# Patient Record
Sex: MALE | Race: BLACK OR AFRICAN AMERICAN | Employment: FULL TIME | URBAN - METROPOLITAN AREA
[De-identification: names, ages, dates, MRNs, and addresses within clinical notes are randomized per-mention and may not be internally consistent; named-entity substitution may affect disease eponyms.]

---

## 2018-08-21 ENCOUNTER — TRANSFERRED RECORDS (OUTPATIENT)
Dept: HEALTH INFORMATION MANAGEMENT | Facility: CLINIC | Age: 28
End: 2018-08-21

## 2018-08-22 ENCOUNTER — HOSPITAL ENCOUNTER (INPATIENT)
Facility: CLINIC | Age: 28
LOS: 1 days | Discharge: HOME | DRG: 115 | End: 2018-08-23
Attending: OPHTHALMOLOGY | Admitting: INTERNAL MEDICINE
Payer: OTHER MISCELLANEOUS

## 2018-08-22 ENCOUNTER — APPOINTMENT (OUTPATIENT)
Dept: GENERAL RADIOLOGY | Facility: CLINIC | Age: 28
End: 2018-08-22
Attending: EMERGENCY MEDICINE
Payer: OTHER MISCELLANEOUS

## 2018-08-22 ENCOUNTER — ANESTHESIA EVENT (OUTPATIENT)
Dept: SURGERY | Facility: CLINIC | Age: 28
DRG: 115 | End: 2018-08-22
Payer: OTHER MISCELLANEOUS

## 2018-08-22 ENCOUNTER — OFFICE VISIT (OUTPATIENT)
Dept: OPHTHALMOLOGY | Facility: CLINIC | Age: 28
End: 2018-08-22
Attending: OPHTHALMOLOGY
Payer: OTHER MISCELLANEOUS

## 2018-08-22 ENCOUNTER — ANESTHESIA (OUTPATIENT)
Dept: SURGERY | Facility: AMBULATORY SURGERY CENTER | Age: 28
End: 2018-08-22

## 2018-08-22 ENCOUNTER — HOSPITAL ENCOUNTER (EMERGENCY)
Facility: CLINIC | Age: 28
Discharge: HOME | End: 2018-08-22
Attending: EMERGENCY MEDICINE | Admitting: EMERGENCY MEDICINE
Payer: OTHER MISCELLANEOUS

## 2018-08-22 ENCOUNTER — ANESTHESIA (OUTPATIENT)
Dept: SURGERY | Facility: CLINIC | Age: 28
DRG: 115 | End: 2018-08-22
Payer: OTHER MISCELLANEOUS

## 2018-08-22 ENCOUNTER — ANESTHESIA EVENT (OUTPATIENT)
Dept: SURGERY | Facility: AMBULATORY SURGERY CENTER | Age: 28
End: 2018-08-22

## 2018-08-22 ENCOUNTER — OFFICE VISIT (OUTPATIENT)
Dept: OPHTHALMOLOGY | Facility: CLINIC | Age: 28
End: 2018-08-22
Attending: OPHTHALMOLOGY

## 2018-08-22 VITALS
BODY MASS INDEX: 26.31 KG/M2 | HEIGHT: 74 IN | OXYGEN SATURATION: 98 % | RESPIRATION RATE: 16 BRPM | SYSTOLIC BLOOD PRESSURE: 114 MMHG | WEIGHT: 205 LBS | TEMPERATURE: 98.5 F | DIASTOLIC BLOOD PRESSURE: 63 MMHG | HEART RATE: 54 BPM

## 2018-08-22 DIAGNOSIS — S05.91XA RIGHT EYE INJURY, INITIAL ENCOUNTER: ICD-10-CM

## 2018-08-22 DIAGNOSIS — S05.91XS RIGHT EYE INJURY, SEQUELA: Primary | ICD-10-CM

## 2018-08-22 DIAGNOSIS — S05.11XA CONTUSION OF GLOBE OF RIGHT EYE, INITIAL ENCOUNTER: Primary | ICD-10-CM

## 2018-08-22 DIAGNOSIS — S05.31XA RUPTURED GLOBE OF RIGHT EYE, INITIAL ENCOUNTER: Primary | ICD-10-CM

## 2018-08-22 PROBLEM — S05.90XA EYE INJURY: Status: ACTIVE | Noted: 2018-08-22

## 2018-08-22 LAB — RADIOLOGIST FLAGS: ABNORMAL

## 2018-08-22 PROCEDURE — 25000128 H RX IP 250 OP 636: Performed by: NURSE ANESTHETIST, CERTIFIED REGISTERED

## 2018-08-22 PROCEDURE — 25000128 H RX IP 250 OP 636: Performed by: ANESTHESIOLOGY

## 2018-08-22 PROCEDURE — 25000566 ZZH SEVOFLURANE, EA 15 MIN: Performed by: OPHTHALMOLOGY

## 2018-08-22 PROCEDURE — 40000135 CT MHEALTH OVERREAD

## 2018-08-22 PROCEDURE — 99285 EMERGENCY DEPT VISIT HI MDM: CPT | Mod: 25 | Performed by: EMERGENCY MEDICINE

## 2018-08-22 PROCEDURE — 25000128 H RX IP 250 OP 636: Performed by: OPHTHALMOLOGY

## 2018-08-22 PROCEDURE — 25000125 ZZHC RX 250: Performed by: NURSE ANESTHETIST, CERTIFIED REGISTERED

## 2018-08-22 PROCEDURE — 25000125 ZZHC RX 250

## 2018-08-22 PROCEDURE — 99284 EMERGENCY DEPT VISIT MOD MDM: CPT | Mod: Z6 | Performed by: EMERGENCY MEDICINE

## 2018-08-22 PROCEDURE — 25000128 H RX IP 250 OP 636: Performed by: EMERGENCY MEDICINE

## 2018-08-22 PROCEDURE — 36000062 ZZH SURGERY LEVEL 4 1ST 30 MIN - UMMC: Performed by: OPHTHALMOLOGY

## 2018-08-22 PROCEDURE — C9399 UNCLASSIFIED DRUGS OR BIOLOG: HCPCS | Performed by: NURSE ANESTHETIST, CERTIFIED REGISTERED

## 2018-08-22 PROCEDURE — G0463 HOSPITAL OUTPT CLINIC VISIT: HCPCS | Mod: ZF

## 2018-08-22 PROCEDURE — 76512 OPH US DX B-SCAN: CPT | Mod: ZF | Performed by: STUDENT IN AN ORGANIZED HEALTH CARE EDUCATION/TRAINING PROGRAM

## 2018-08-22 PROCEDURE — 27210794 ZZH OR GENERAL SUPPLY STERILE: Performed by: OPHTHALMOLOGY

## 2018-08-22 PROCEDURE — 37000009 ZZH ANESTHESIA TECHNICAL FEE, EACH ADDTL 15 MIN: Performed by: OPHTHALMOLOGY

## 2018-08-22 PROCEDURE — 96374 THER/PROPH/DIAG INJ IV PUSH: CPT | Performed by: EMERGENCY MEDICINE

## 2018-08-22 PROCEDURE — 37000008 ZZH ANESTHESIA TECHNICAL FEE, 1ST 30 MIN: Performed by: OPHTHALMOLOGY

## 2018-08-22 PROCEDURE — 40000170 ZZH STATISTIC PRE-PROCEDURE ASSESSMENT II: Performed by: OPHTHALMOLOGY

## 2018-08-22 PROCEDURE — 25000125 ZZHC RX 250: Performed by: OPHTHALMOLOGY

## 2018-08-22 PROCEDURE — 12000001 ZZH R&B MED SURG/OB UMMC

## 2018-08-22 PROCEDURE — 96376 TX/PRO/DX INJ SAME DRUG ADON: CPT | Performed by: EMERGENCY MEDICINE

## 2018-08-22 PROCEDURE — 36000064 ZZH SURGERY LEVEL 4 EA 15 ADDTL MIN - UMMC: Performed by: OPHTHALMOLOGY

## 2018-08-22 PROCEDURE — 25000132 ZZH RX MED GY IP 250 OP 250 PS 637: Performed by: ANESTHESIOLOGY

## 2018-08-22 PROCEDURE — 71000014 ZZH RECOVERY PHASE 1 LEVEL 2 FIRST HR: Performed by: OPHTHALMOLOGY

## 2018-08-22 PROCEDURE — 40000269 ZZH STATISTIC NO CHARGE FACILITY FEE: Mod: ZF

## 2018-08-22 RX ORDER — ATROPINE SULFATE 10 MG/ML
SOLUTION/ DROPS OPHTHALMIC PRN
Status: DISCONTINUED | OUTPATIENT
Start: 2018-08-22 | End: 2018-08-22 | Stop reason: HOSPADM

## 2018-08-22 RX ORDER — PREDNISOLONE ACETATE 10 MG/ML
1-2 SUSPENSION/ DROPS OPHTHALMIC
Qty: 1 BOTTLE | Refills: 0 | Status: ON HOLD | OUTPATIENT
Start: 2018-08-22 | End: 2018-08-23

## 2018-08-22 RX ORDER — ATROPINE SULFATE 10 MG/ML
1 SOLUTION/ DROPS OPHTHALMIC 2 TIMES DAILY
Status: DISCONTINUED | OUTPATIENT
Start: 2018-08-22 | End: 2018-08-22

## 2018-08-22 RX ORDER — LIDOCAINE HYDROCHLORIDE 20 MG/ML
INJECTION, SOLUTION INFILTRATION; PERINEURAL PRN
Status: DISCONTINUED | OUTPATIENT
Start: 2018-08-22 | End: 2018-08-22

## 2018-08-22 RX ORDER — PREDNISOLONE ACETATE 10 MG/ML
1 SUSPENSION/ DROPS OPHTHALMIC 4 TIMES DAILY
Status: DISCONTINUED | OUTPATIENT
Start: 2018-08-23 | End: 2018-08-23 | Stop reason: HOSPADM

## 2018-08-22 RX ORDER — PROPOFOL 10 MG/ML
INJECTION, EMULSION INTRAVENOUS PRN
Status: DISCONTINUED | OUTPATIENT
Start: 2018-08-22 | End: 2018-08-22

## 2018-08-22 RX ORDER — MEPERIDINE HYDROCHLORIDE 25 MG/ML
12.5 INJECTION INTRAMUSCULAR; INTRAVENOUS; SUBCUTANEOUS
Status: DISCONTINUED | OUTPATIENT
Start: 2018-08-22 | End: 2018-08-22 | Stop reason: HOSPADM

## 2018-08-22 RX ORDER — FENTANYL CITRATE 50 UG/ML
INJECTION, SOLUTION INTRAMUSCULAR; INTRAVENOUS PRN
Status: DISCONTINUED | OUTPATIENT
Start: 2018-08-22 | End: 2018-08-22

## 2018-08-22 RX ORDER — SODIUM CHLORIDE, SODIUM LACTATE, POTASSIUM CHLORIDE, CALCIUM CHLORIDE 600; 310; 30; 20 MG/100ML; MG/100ML; MG/100ML; MG/100ML
INJECTION, SOLUTION INTRAVENOUS CONTINUOUS
Status: DISCONTINUED | OUTPATIENT
Start: 2018-08-22 | End: 2018-08-22 | Stop reason: HOSPADM

## 2018-08-22 RX ORDER — ONDANSETRON 4 MG/1
4 TABLET, ORALLY DISINTEGRATING ORAL EVERY 30 MIN PRN
Status: DISCONTINUED | OUTPATIENT
Start: 2018-08-22 | End: 2018-08-22 | Stop reason: HOSPADM

## 2018-08-22 RX ORDER — NALOXONE HYDROCHLORIDE 0.4 MG/ML
.1-.4 INJECTION, SOLUTION INTRAMUSCULAR; INTRAVENOUS; SUBCUTANEOUS
Status: DISCONTINUED | OUTPATIENT
Start: 2018-08-22 | End: 2018-08-22 | Stop reason: HOSPADM

## 2018-08-22 RX ORDER — ATROPINE SULFATE 10 MG/ML
1-2 SOLUTION/ DROPS OPHTHALMIC 2 TIMES DAILY
Qty: 1 BOTTLE | Refills: 11 | Status: ON HOLD | OUTPATIENT
Start: 2018-08-22 | End: 2018-08-23

## 2018-08-22 RX ORDER — ONDANSETRON 2 MG/ML
4 INJECTION INTRAMUSCULAR; INTRAVENOUS EVERY 6 HOURS PRN
Status: DISCONTINUED | OUTPATIENT
Start: 2018-08-22 | End: 2018-08-23 | Stop reason: HOSPADM

## 2018-08-22 RX ORDER — OXYCODONE AND ACETAMINOPHEN 5; 325 MG/1; MG/1
1 TABLET ORAL EVERY 6 HOURS PRN
Status: DISCONTINUED | OUTPATIENT
Start: 2018-08-22 | End: 2018-08-23 | Stop reason: HOSPADM

## 2018-08-22 RX ORDER — DEXAMETHASONE SODIUM PHOSPHATE 4 MG/ML
INJECTION, SOLUTION INTRA-ARTICULAR; INTRALESIONAL; INTRAMUSCULAR; INTRAVENOUS; SOFT TISSUE PRN
Status: DISCONTINUED | OUTPATIENT
Start: 2018-08-22 | End: 2018-08-22 | Stop reason: HOSPADM

## 2018-08-22 RX ORDER — BALANCED SALT SOLUTION 6.4; .75; .48; .3; 3.9; 1.7 MG/ML; MG/ML; MG/ML; MG/ML; MG/ML; MG/ML
SOLUTION OPHTHALMIC PRN
Status: DISCONTINUED | OUTPATIENT
Start: 2018-08-22 | End: 2018-08-22 | Stop reason: HOSPADM

## 2018-08-22 RX ORDER — ONDANSETRON 2 MG/ML
4 INJECTION INTRAMUSCULAR; INTRAVENOUS EVERY 30 MIN PRN
Status: DISCONTINUED | OUTPATIENT
Start: 2018-08-22 | End: 2018-08-22 | Stop reason: HOSPADM

## 2018-08-22 RX ORDER — ATROPINE SULFATE 10 MG/ML
1 SOLUTION/ DROPS OPHTHALMIC 2 TIMES DAILY
Status: DISCONTINUED | OUTPATIENT
Start: 2018-08-23 | End: 2018-08-23 | Stop reason: HOSPADM

## 2018-08-22 RX ORDER — FENTANYL CITRATE 50 UG/ML
25-50 INJECTION, SOLUTION INTRAMUSCULAR; INTRAVENOUS
Status: DISCONTINUED | OUTPATIENT
Start: 2018-08-22 | End: 2018-08-22 | Stop reason: HOSPADM

## 2018-08-22 RX ORDER — SODIUM CHLORIDE, SODIUM LACTATE, POTASSIUM CHLORIDE, CALCIUM CHLORIDE 600; 310; 30; 20 MG/100ML; MG/100ML; MG/100ML; MG/100ML
INJECTION, SOLUTION INTRAVENOUS CONTINUOUS PRN
Status: DISCONTINUED | OUTPATIENT
Start: 2018-08-22 | End: 2018-08-22

## 2018-08-22 RX ORDER — FENTANYL CITRATE 50 UG/ML
50 INJECTION, SOLUTION INTRAMUSCULAR; INTRAVENOUS
Status: ACTIVE | OUTPATIENT
Start: 2018-08-22 | End: 2018-08-22

## 2018-08-22 RX ORDER — ESMOLOL HYDROCHLORIDE 10 MG/ML
INJECTION INTRAVENOUS PRN
Status: DISCONTINUED | OUTPATIENT
Start: 2018-08-22 | End: 2018-08-22

## 2018-08-22 RX ORDER — DEXAMETHASONE SODIUM PHOSPHATE 4 MG/ML
INJECTION, SOLUTION INTRA-ARTICULAR; INTRALESIONAL; INTRAMUSCULAR; INTRAVENOUS; SOFT TISSUE PRN
Status: DISCONTINUED | OUTPATIENT
Start: 2018-08-22 | End: 2018-08-22

## 2018-08-22 RX ORDER — OXYCODONE HYDROCHLORIDE 5 MG/1
5 TABLET ORAL ONCE
Status: COMPLETED | OUTPATIENT
Start: 2018-08-22 | End: 2018-08-22

## 2018-08-22 RX ORDER — ACETAMINOPHEN 325 MG/1
650 TABLET ORAL EVERY 4 HOURS PRN
Status: DISCONTINUED | OUTPATIENT
Start: 2018-08-22 | End: 2018-08-23 | Stop reason: HOSPADM

## 2018-08-22 RX ORDER — NALOXONE HYDROCHLORIDE 0.4 MG/ML
.1-.4 INJECTION, SOLUTION INTRAMUSCULAR; INTRAVENOUS; SUBCUTANEOUS
Status: DISCONTINUED | OUTPATIENT
Start: 2018-08-22 | End: 2018-08-23 | Stop reason: HOSPADM

## 2018-08-22 RX ORDER — MOXIFLOXACIN 5 MG/ML
1 SOLUTION/ DROPS OPHTHALMIC 4 TIMES DAILY
Status: DISCONTINUED | OUTPATIENT
Start: 2018-08-23 | End: 2018-08-23 | Stop reason: HOSPADM

## 2018-08-22 RX ORDER — MULTIPLE VITAMINS W/ MINERALS TAB 9MG-400MCG
1 TAB ORAL DAILY
COMMUNITY

## 2018-08-22 RX ORDER — PROPOFOL 10 MG/ML
INJECTION, EMULSION INTRAVENOUS CONTINUOUS PRN
Status: DISCONTINUED | OUTPATIENT
Start: 2018-08-22 | End: 2018-08-22

## 2018-08-22 RX ORDER — HYDROMORPHONE HYDROCHLORIDE 1 MG/ML
1 INJECTION, SOLUTION INTRAMUSCULAR; INTRAVENOUS; SUBCUTANEOUS
Status: DISCONTINUED | OUTPATIENT
Start: 2018-08-22 | End: 2018-08-22 | Stop reason: HOSPADM

## 2018-08-22 RX ORDER — OXYCODONE AND ACETAMINOPHEN 5; 325 MG/1; MG/1
1 TABLET ORAL EVERY 6 HOURS PRN
Qty: 20 TABLET | Refills: 0 | Status: ON HOLD | OUTPATIENT
Start: 2018-08-22 | End: 2018-08-23

## 2018-08-22 RX ADMIN — FENTANYL CITRATE 100 MCG: 50 INJECTION, SOLUTION INTRAMUSCULAR; INTRAVENOUS at 20:49

## 2018-08-22 RX ADMIN — Medication 1 MG: at 07:54

## 2018-08-22 RX ADMIN — SODIUM CHLORIDE, POTASSIUM CHLORIDE, SODIUM LACTATE AND CALCIUM CHLORIDE: 600; 310; 30; 20 INJECTION, SOLUTION INTRAVENOUS at 16:56

## 2018-08-22 RX ADMIN — FENTANYL CITRATE 50 MCG: 50 INJECTION, SOLUTION INTRAMUSCULAR; INTRAVENOUS at 17:35

## 2018-08-22 RX ADMIN — FENTANYL CITRATE 25 MCG: 50 INJECTION, SOLUTION INTRAMUSCULAR; INTRAVENOUS at 17:30

## 2018-08-22 RX ADMIN — FENTANYL CITRATE 100 MCG: 50 INJECTION, SOLUTION INTRAMUSCULAR; INTRAVENOUS at 17:07

## 2018-08-22 RX ADMIN — SODIUM CHLORIDE, POTASSIUM CHLORIDE, SODIUM LACTATE AND CALCIUM CHLORIDE: 600; 310; 30; 20 INJECTION, SOLUTION INTRAVENOUS at 20:55

## 2018-08-22 RX ADMIN — HYDROMORPHONE HYDROCHLORIDE 0.25 MG: 1 INJECTION, SOLUTION INTRAMUSCULAR; INTRAVENOUS; SUBCUTANEOUS at 17:38

## 2018-08-22 RX ADMIN — PROPOFOL 200 MG: 10 INJECTION, EMULSION INTRAVENOUS at 17:03

## 2018-08-22 RX ADMIN — ROCURONIUM BROMIDE 50 MG: 10 INJECTION INTRAVENOUS at 17:05

## 2018-08-22 RX ADMIN — HYDROMORPHONE HYDROCHLORIDE 0.25 MG: 1 INJECTION, SOLUTION INTRAMUSCULAR; INTRAVENOUS; SUBCUTANEOUS at 19:30

## 2018-08-22 RX ADMIN — PROPOFOL 100 MG: 10 INJECTION, EMULSION INTRAVENOUS at 17:05

## 2018-08-22 RX ADMIN — ESMOLOL HYDROCHLORIDE 100 MG: 10 INJECTION, SOLUTION INTRAVENOUS at 17:07

## 2018-08-22 RX ADMIN — DEXAMETHASONE SODIUM PHOSPHATE 8 MG: 4 INJECTION, SOLUTION INTRAMUSCULAR; INTRAVENOUS at 17:24

## 2018-08-22 RX ADMIN — OXYCODONE HYDROCHLORIDE 5 MG: 5 TABLET ORAL at 22:08

## 2018-08-22 RX ADMIN — SUGAMMADEX 200 MG: 100 INJECTION, SOLUTION INTRAVENOUS at 20:30

## 2018-08-22 RX ADMIN — HYDROMORPHONE HYDROCHLORIDE 0.25 MG: 1 INJECTION, SOLUTION INTRAMUSCULAR; INTRAVENOUS; SUBCUTANEOUS at 19:40

## 2018-08-22 RX ADMIN — LIDOCAINE HYDROCHLORIDE 60 MG: 20 INJECTION, SOLUTION INFILTRATION; PERINEURAL at 17:05

## 2018-08-22 RX ADMIN — ONDANSETRON 4 MG: 2 INJECTION INTRAMUSCULAR; INTRAVENOUS at 20:22

## 2018-08-22 RX ADMIN — FENTANYL CITRATE 25 MCG: 50 INJECTION, SOLUTION INTRAMUSCULAR; INTRAVENOUS at 17:32

## 2018-08-22 RX ADMIN — PROPOFOL 30 MCG/KG/MIN: 10 INJECTION, EMULSION INTRAVENOUS at 17:26

## 2018-08-22 RX ADMIN — FENTANYL CITRATE 50 MCG: 50 INJECTION, SOLUTION INTRAMUSCULAR; INTRAVENOUS at 16:02

## 2018-08-22 RX ADMIN — Medication 1 MG: at 06:38

## 2018-08-22 RX ADMIN — HYDROMORPHONE HYDROCHLORIDE 0.25 MG: 1 INJECTION, SOLUTION INTRAMUSCULAR; INTRAVENOUS; SUBCUTANEOUS at 17:46

## 2018-08-22 ASSESSMENT — CONF VISUAL FIELD
OD_INFERIOR_TEMPORAL_RESTRICTION: 1
OD_INFERIOR_TEMPORAL_RESTRICTION: 1
OD_SUPERIOR_TEMPORAL_RESTRICTION: 1
OD_INFERIOR_NASAL_RESTRICTION: 1
OD_SUPERIOR_NASAL_RESTRICTION: 1
OS_NORMAL: 1
OD_SUPERIOR_TEMPORAL_RESTRICTION: 1
OD_SUPERIOR_NASAL_RESTRICTION: 1
OD_INFERIOR_NASAL_RESTRICTION: 1

## 2018-08-22 ASSESSMENT — VISUAL ACUITY
METHOD: SNELLEN - LINEAR
OD_SC: LP
OD_SC: HM
OS_SC: 20/20
OS_SC: 20/20
METHOD: SNELLEN - LINEAR

## 2018-08-22 ASSESSMENT — TONOMETRY
OD_IOP_MMHG: 17
OD_IOP_MMHG: 17
IOP_METHOD: ICARE
OS_IOP_MMHG: 17
IOP_METHOD: ICARE
OS_IOP_MMHG: 17

## 2018-08-22 ASSESSMENT — SLIT LAMP EXAM - LIDS: COMMENTS: NORMAL

## 2018-08-22 ASSESSMENT — EXTERNAL EXAM - LEFT EYE: OS_EXAM: NORMAL

## 2018-08-22 NOTE — LETTER
8/22/2018       RE: Willam Mills  [address] [facility] [address]     Dear Colleague,    Thank you for referring your patient, Willam Mills, to the EYE CLINIC at [facility]. Please see a copy of my visit note below.    CC: trauma right eye  HPI: Willam Mills is a  27 year old year-old patient with history of trauma to the right eye with a baseball yesterday in [place]. Referred here for management    Retinal Imaging:  bscan right eye   RE: vitreous hemorrahge, lens subluxation, choroidal hemorrhage, possible RD     Assessment & Plan:  1- trauma right eye   - possible Ruptured globe right eye   - possible  posterior rupture given hyperdeep chamber and US and CT   - need to have exploration and possible repair today   - if too posterior rupture globe is found, will need to await closure of posterior rupture prior to exploration and rapair by retina   - will prescribe pain meds   - follow up 1 week with retina    - Might need vitrectomy surgery in the future (PPV/SB/EL/FAX/SO/ possible lensectomy)    2- orbital floor fx   - seen on CT scan    - no obvious pneumo-orbit by palpation or on CT   - pain to palpation of the infraorbital rim   - decreased V2 sensation    - patient with pain to movement of the eye supra and infra duction    -  oculoplastics consulted    Chele Madsen MD, PhD  Vitreoretinal Surgery Fellow  ~~~~~~~~~~~~~~~~~~~~~~~~~~~~~~~~~~  Complete documentation of historical and exam elements from today's encounter can be found in the full encounter summary report (not reduplicated in this progress note).  I personally obtained the chief complaint(s) and history of present illness.  I confirmed and edited as necessary the review of systems, past medical/surgical history, family history, social history, and examination findings as documented by others; and I examined the patient myself.  I personally reviewed the relevant tests, images, and reports as  documented above.  I personally reviewed the ophthalmic test(s) associated with this encounter, agree with the interpretation(s) as documented by the resident/fellow, and have edited the corresponding report(s) as necessary.   I formulated and edited as necessary the assessment and plan and discussed the findings and management plan with the patient and family. Norma Aleman MD      Again, thank you for allowing me to participate in the care of your patient.      Sincerely,    Norma Aleman MD     Retina Service   Department of Ophthalmology and Visual Neurosciences   [facility]  Phone:  [phone]   Fax:  [phone]

## 2018-08-22 NOTE — MR AVS SNAPSHOT
After Visit Summary   2018    Willam Mills    MRN: [id number]           Patient Information     Date Of Birth          1990        Visit Information        Provider Department      2018 8:00 AM Sade Fink MD [facility]        Today's Diagnoses     Contusion of globe of right eye, initial encounter    -  1       Follow-ups after your visit        Your next 10 appointments already scheduled     Aug 30, 2018  1:00 PM CDT   RETURN GENERAL with Edu Krishnamurthy MD   Eye Clinic ([facility])    [address] [address]   [phone]            Aug 30, 2018  1:30 PM CDT   Post-Op with Norma Aleman MD   Eye Clinic ([facility])    [address] [address]   [phone]              Who to contact     Please call your clinic at [phone] to:    Ask questions about your health    Make or cancel appointments    Discuss your medicines    Learn about your test results    Speak to your doctor            Additional Information About Your Visit        [employer] Information     [employer] is an electronic gateway that provides easy, online access to your medical records. With [employer], you can request a clinic appointment, read your test results, renew a prescription or communicate with your care team.     To sign up for [employer] visit the website at [email or web address]   You will be asked to enter the access code listed below, as well as some personal information. Please follow the directions to create your username and password.     Your access code is: [email or web address]  Expires: 2018  8:19 AM     Your access code will  in 90 days. If you need help or a new code, please contact your [facility] or call [phone] for assistance.        Care EveryWhere ID     This is your Care EveryWhere ID. This could be  used by other organizations to access your [place] medical records  [id number]         Blood Pressure from Last 3 Encounters:   08/23/18 133/75   08/22/18 114/63    Weight from Last 3 Encounters:   08/22/18 92.5 kg (203 lb 14.8 oz)   08/22/18 93 kg (205 lb)              We Performed the Following     Ultrasound B-scan OD (right eye)          Today's Medication Changes          These changes are accurate as of 8/22/18  3:30 PM.  If you have any questions, ask your nurse or doctor.               Start taking these medicines.        Dose/Directions    atropine 1 % ophthalmic solution   Used for:  Ruptured globe of right eye, initial encounter   Started by:  Norma Aleman MD        Dose:  1-2 drop   Place 1-2 drops into the right eye 2 times daily   Quantity:  1 Bottle   Refills:  11       prednisoLONE acetate 1 % ophthalmic susp   Commonly known as:  PRED FORTE   Used for:  Ruptured globe of right eye, initial encounter   Started by:  Norma Aleman MD        Dose:  1-2 drop   Place 1-2 drops into the right eye every 2 hours   Quantity:  1 Bottle   Refills:  0         These medicines have changed or have updated prescriptions.        Dose/Directions    * oxyCODONE-acetaminophen 5-325 MG per tablet   Commonly known as:  PERCOCET   This may have changed:  You were already taking a medication with the same name, and this prescription was added. Make sure you understand how and when to take each.   Used for:  Ruptured globe of right eye, initial encounter   Changed by:  Norma Aleman MD        Dose:  1 tablet   Take 1 tablet by mouth every 6 hours as needed for pain   Quantity:  20 tablet   Refills:  0       * oxyCODONE-acetaminophen 5-325 MG per tablet   Commonly known as:  PERCOCET   This may have changed:  Another medication with the same name was added. Make sure you understand how and when to take each.   Used for:  Right eye injury, sequela   Changed by:  Norma Aleman MD         Dose:  1 tablet   Take 1 tablet by mouth every 6 hours as needed for pain   Quantity:  20 tablet   Refills:  0       * Notice:  This list has 2 medication(s) that are the same as other medications prescribed for you. Read the directions carefully, and ask your doctor or other care provider to review them with you.         Where to get your medicines      These medications were sent to [id number]  [place] - [address]  [address] [place] [address]     Phone:  [phone]     atropine 1 % ophthalmic solution    prednisoLONE acetate 1 % ophthalmic susp         Some of these will need a paper prescription and others can be bought over the counter.  Ask your nurse if you have questions.     Bring a paper prescription for each of these medications     oxyCODONE-acetaminophen 5-325 MG per tablet                Primary Care Provider    None Specified       No primary provider on file.        Equal Access to Services     SIVAN [facility] : Marbin Colon, sis guerin, janak christensen, duc denson. So [facility] [phone].    ATENCIÓN: Si habla español, tiene a stacy disposición servicios gratuitos de asistencia lingüística. Krishan al [phone].    We comply with applicable federal civil rights laws and [place] laws. We do not discriminate on the basis of race, color, national origin, age, disability, sex, sexual orientation, or gender identity.            Thank you!     Thank you for choosing EYE CLINIC  for your care. Our goal is always to provide you with excellent care. Hearing back from our patients is one way we can continue to improve our services. Please take a few minutes to complete the written survey that you may receive in the mail after your visit with us. Thank you!             Your Updated Medication List - Protect others around you: Learn how to safely use, store and throw away your medicines at  www.disposemymeds.org.          This list is accurate as of 8/22/18  3:30 PM.  Always use your most recent med list.                   Brand Name Dispense Instructions for use Diagnosis    atropine 1 % ophthalmic solution     1 Bottle    Place 1-2 drops into the right eye 2 times daily    Ruptured globe of right eye, initial encounter       * oxyCODONE-acetaminophen 5-325 MG per tablet    PERCOCET    20 tablet    Take 1 tablet by mouth every 6 hours as needed for pain    Ruptured globe of right eye, initial encounter       * oxyCODONE-acetaminophen 5-325 MG per tablet    PERCOCET    20 tablet    Take 1 tablet by mouth every 6 hours as needed for pain    Right eye injury, sequela       prednisoLONE acetate 1 % ophthalmic susp    PRED FORTE    1 Bottle    Place 1-2 drops into the right eye every 2 hours    Ruptured globe of right eye, initial encounter       * Notice:  This list has 2 medication(s) that are the same as other medications prescribed for you. Read the directions carefully, and ask your doctor or other care provider to review them with you.
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
## 2018-08-22 NOTE — MR AVS SNAPSHOT
After Visit Summary   2018    Willam Mills    MRN: [id number]           Patient Information     Date Of Birth          1990        Visit Information        Provider Department      2018 10:30 AM Norma Aleman MD [facility]        Today's Diagnoses     Ruptured globe of right eye, initial encounter    -  1       Follow-ups after your visit        Follow-up notes from your care team     Return in about 1 week (around 2018).      Who to contact     Please call your clinic at [phone] to:    Ask questions about your health    Make or cancel appointments    Discuss your medicines    Learn about your test results    Speak to your doctor            Additional Information About Your Visit        MyChart Information     [email or web address] is an electronic gateway that provides easy, online access to your medical records. With [employer], you can request a clinic appointment, read your test results, renew a prescription or communicate with your care team.     To sign up for [employer] visit the website at [email or web address]   You will be asked to enter the access code listed below, as well as some personal information. Please follow the directions to create your username and password.     Your access code is: [email or web address]  Expires: 2018  8:19 AM     Your access code will  in 90 days. If you need help or a new code, please contact your [facility] or call [phone] for assistance.        Care EveryWhere ID     This is your Care EveryWhere ID. This could be used by other organizations to access your [place] medical records  [id number]         Blood Pressure from Last 3 Encounters:   18 114/63    Weight from Last 3 Encounters:   18 92.5 kg (203 lb 14.8 oz)   18 93 kg (205 lb)              Today, you had the following     No orders found for display         Today's Medication Changes          These changes are accurate as  of 8/22/18  3:30 PM.  If you have any questions, ask your nurse or doctor.               Start taking these medicines.        Dose/Directions    atropine 1 % ophthalmic solution   Used for:  Ruptured globe of right eye, initial encounter   Started by:  Norma Aleman MD        Dose:  1-2 drop   Place 1-2 drops into the right eye 2 times daily   Quantity:  1 Bottle   Refills:  11       oxyCODONE-acetaminophen 5-325 MG per tablet   Commonly known as:  PERCOCET   Used for:  Ruptured globe of right eye, initial encounter   Started by:  Norma Aleman MD        Dose:  1 tablet   Take 1 tablet by mouth every 6 hours as needed for pain   Quantity:  20 tablet   Refills:  0       prednisoLONE acetate 1 % ophthalmic susp   Commonly known as:  PRED FORTE   Used for:  Ruptured globe of right eye, initial encounter   Started by:  Norma Aleman MD        Dose:  1-2 drop   Place 1-2 drops into the right eye every 2 hours   Quantity:  1 Bottle   Refills:  0            Where to get your medicines      These medications were sent to [address]  [address] [facility] [address]     Phone:  [phone]     atropine 1 % ophthalmic solution    prednisoLONE acetate 1 % ophthalmic susp         Some of these will need a paper prescription and others can be bought over the counter.  Ask your nurse if you have questions.     Bring a paper prescription for each of these medications     oxyCODONE-acetaminophen 5-325 MG per tablet               Information about OPIOIDS     PRESCRIPTION OPIOIDS: WHAT YOU NEED TO KNOW   We gave you an opioid (narcotic) pain medicine. It is important to manage your pain, but opioids are not always the best choice. You should first try all the other options your care team gave you. Take this medicine for as short a time (and as few doses) as possible.    Some activities can increase your pain, such as bandage changes or therapy  sessions. It may help to take your pain medicine 30 to 60 minutes before these activities. Reduce your stress by getting enough sleep, working on hobbies you enjoy and practicing relaxation or meditation. Talk to your care team about ways to manage your pain beyond prescription opioids.    These medicines have risks:    DO NOT drive when on new or higher doses of pain medicine. These medicines can affect your alertness and reaction times, and you could be arrested for driving under the influence (DUI). If you need to use opioids long-term, talk to your care team about driving.    DO NOT operate heavy machinery    DO NOT do any other dangerous activities while taking these medicines.    DO NOT drink any alcohol while taking these medicines.     If the opioid prescribed includes acetaminophen, DO NOT take with any other medicines that contain acetaminophen. Read all labels carefully. Look for the word  acetaminophen  or  Tylenol.  Ask your pharmacist if you have questions or are unsure.    You can get addicted to pain medicines, especially if you have a history of addiction (chemical, alcohol or substance dependence). Talk to your care team about ways to reduce this risk.    All opioids tend to cause constipation. Drink plenty of water and eat foods that have a lot of fiber, such as fruits, vegetables, prune juice, apple juice and high-fiber cereal. Take a laxative (Miralax, milk of magnesia, Colace, Senna) if you don t move your bowels at least every other day. Other side effects include upset stomach, sleepiness, dizziness, throwing up, tolerance (needing more of the medicine to have the same effect), physical dependence and slowed breathing.    Store your pills in a secure place, locked if possible. We will not replace any lost or stolen medicine. If you don t finish your medicine, please throw away (dispose) as directed by your pharmacist. The [place] Pollution Control Agency has more information about safe  disposal: [email or web address].pca.[email or web address].mn.us/living-green/managing-unwanted-medications         Primary Care Provider Fax #    Provider Not In System [phone]                Equal Access to Services     YEEPETE BRENDON : Hadii aad ku hadmichelle Colon, wabekada luqadaha, qatonta kaelier christensen, duc dickbrendan jarett. So [facility] [phone].    ATENCIÓN: Si habla español, tiene a stacy disposición servicios gratuitos de asistencia lingüística. Llame al [phone].    We comply with applicable federal civil rights laws and [place] laws. We do not discriminate on the basis of race, color, national origin, age, disability, sex, sexual orientation, or gender identity.            Thank you!     Thank you for choosing EYE CLINIC  for your care. Our goal is always to provide you with excellent care. Hearing back from our patients is one way we can continue to improve our services. Please take a few minutes to complete the written survey that you may receive in the mail after your visit with us. Thank you!             Your Updated Medication List - Protect others around you: Learn how to safely use, store and throw away your medicines at www.disposemymeds.org.          This list is accurate as of 8/22/18  3:30 PM.  Always use your most recent med list.                   Brand Name Dispense Instructions for use Diagnosis    atropine 1 % ophthalmic solution     1 Bottle    Place 1-2 drops into the right eye 2 times daily    Ruptured globe of right eye, initial encounter       oxyCODONE-acetaminophen 5-325 MG per tablet    PERCOCET    20 tablet    Take 1 tablet by mouth every 6 hours as needed for pain    Ruptured globe of right eye, initial encounter       prednisoLONE acetate 1 % ophthalmic susp    PRED FORTE    1 Bottle    Place 1-2 drops into the right eye every 2 hours    Ruptured globe of right eye, initial encounter
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
## 2018-08-22 NOTE — CONSULTS
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 He was seen at an ED in [place] by an ophthalmologist who was concerned for ruptured globe based on CT scan as well as medial and orbital wall fractures. He was transferred here for further evaluation.    He is otherwise healthy, no past ocular or medical history, no history of eye surgeries.    He received IV moxifloxacin, tetanus shot, eye shielded, IV zofran prior to transportation to this hospital. Arrived with shield in place.          Review of systems were otherwise negative except for that which has been stated above.      OCULAR/MEDICAL/SURGICAL HISTORIES:     Past Ocular History:  none    History reviewed. No pertinent past medical history.    History reviewed. No pertinent surgical history.    EXAMINATION:     Visual Acuity: Right Eye Hand motion ; Left Eye 20/20+1   Pupils: Unable to assess right eye, Right APD by reverse    Intraocular Pressure: RE  13  (gentle); LE 16  mmHg by tonopen (<5% error).   Motility: RE limited infraduction; LE Full  Confrontational Visual Field: RE unable; LE Full     External/Slit Lamp Exam   RIGHT EYE   Lids/Lashes: significant periorbital edema   Conj/Sclera: +340 degree hemorrhagic chemosis, area of inferonasal sparing off the margin of the iris   Cornea:  Clear   Ant Chamber: inferior hyphema difficult to measure, dense microhyphema, formed AC   Iris: unable to assess   Lens: unable to assess  LEFT   Lids/lashes: No Abnormality   Conj/Sclera: White and Quiet   Cornea:  Clear   Ant Chamber:  Deep and Quiet   Iris: Round and Reactive   Lens:Clear    Dilated Fundus Exam  Eyes Dilated? LEFT only   Time: 7:40AM With Phenylephrine 2.5% and Mydriacyl 1%    (Normally, dilation with the  above lasts about 4-6 hours)  See clinic note for details    Labs/Studies/Imaging Performed  CT scan: posterior hemorrhage, posterior globe deformity  CT scan reviewed with neuroradiology attending, concern for posterior rupture.Orbital floor fracture, soft tissue swelling and hemorrhage, fracture of right lamina papyracea     ASSESSMENT/PLAN:     Willam Mills is a 27 year old male who presents with right posterior ruptured globe.  - Will bring patient to clinic for B scan, see clinic note for details  - further plan as in Clinic note, will likely allow 1-2 weeks of healing, then plan for vitrectomy      It is our pleasure to participate in this patient's care and treatment. Please contact us with any further questions or concerns.    Discussed with Edu Krishnamurthy, neuro-ophthalmology fellow,    Sade Fink MD   PGY-3 Ophthalmology  [phone]

## 2018-08-22 NOTE — ED AVS SNAPSHOT
[facility], Emergency Department    [address] [facility] [address]    Phone:  [phone]                                       Willam Mills   MRN: [id number]    Department:  [facility], Emergency Department   Date of Visit:  8/22/2018           Patient Information     Date Of Birth          1990        Your diagnoses for this visit were:     Right eye injury, initial encounter        You were seen by Royce Leal MD and Shilpa Swenson MD.        Discharge Instructions       Follow-up with ophthalmology as scheduled.      If you have significant headache, persistent vomiting, dizziness, or other concerns, return to the emergency department for re-evaluation.      Your next 10 appointments already scheduled     Aug 22, 2018   Procedure with Edu Krishnamurthy MD   [facility] ([facility])    [address] [place] [address]  [address] [facility] [address] [phone]           Located in the Clinics and Surgery Center at [address] [place] [address].   parking is very convenient and highly recommended.  is a $6 flat rate fee.  Both  and self parkers should enter the main arrival plaza from [facility]; parking attendants will direct you based on your parking preference.              24 Hour Appointment Hotline       To make an appointment at any [facility], call [phone] ([phone]). If you don't have a family doctor or clinic, we will help you find one. [facility] are conveniently located to serve the needs of you and your family.             Review of your medicines      Notice     You have not been prescribed any medications.            Procedures and tests performed during your visit     CT [facility] Overread      Orders Needing Specimen Collection     None      Pending Results     Date and Time Order Name Status Description    8/22/2018 0715 CT [facility] Overread In process            "  Pending Culture Results     No orders found from 2018 to 2018.            Pending Results Instructions     If you had any lab results that were not finalized at the time of your Discharge, you can call the ED Lab Result RN at [phone]. You will be contacted by this team for any positive Lab results or changes in treatment. The nurses are available 7 days a week from 10A to 6:30P.  You can leave a message 24 hours per day and they will return your call.        Thank you for choosing [facility]       Thank you for choosing [facility] for your care. Our goal is always to provide you with excellent care. Hearing back from our patients is one way we can continue to improve our services. Please take a few minutes to complete the written survey that you may receive in the mail after you visit with us. Thank you!        [facility] Information     [facility] lets you send messages to your doctor, view your test results, renew your prescriptions, schedule appointments and more. To sign up, go to [email or web address] . Click on \"Log in\" on the left side of the screen, which will take you to the Welcome page. Then click on \"Sign up Now\" on the right side of the page.     You will be asked to enter the access code listed below, as well as some personal information. Please follow the directions to create your username and password.     Your access code is: [email or web address]  Expires: 2018  8:19 AM     Your access code will  in 90 days. If you need help or a new code, please call your [facility] clinic or [phone].        Care EveryWhere ID     This is your Care EveryWhere ID. This could be used by other organizations to access your [facility] medical records  [id number]        Equal Access to Services     SIVAN OLIVAS : Marbin Colon, sis guerin, duc cornejo. So [facility] [phone].    ATENCIÓN: Si habla español, tiene a stacy disposición " servicios gratuitos de asistencia lingüística. Krishan spencer [phone].    We comply with applicable federal civil rights laws and [place] laws. We do not discriminate on the basis of race, color, national origin, age, disability, sex, sexual orientation, or gender identity.            After Visit Summary       This is your record. Keep this with you and show to your community pharmacist(s) and doctor(s) at your next visit.

## 2018-08-22 NOTE — ED AVS SNAPSHOT
[facility], [place], Emergency Department    [address] [place] [address]    Phone:  [phone]                                       Willam Mills   MRN: [id number]    Department:  [facility], Emergency Department   Date of Visit:  8/22/2018           After Visit Summary Signature Page     I have received my discharge instructions, and my questions have been answered. I have discussed any challenges I see with this plan with the nurse or doctor.    ..........................................................................................................................................  Patient/Patient Representative Signature      ..........................................................................................................................................  Patient Representative Print Name and Relationship to Patient    ..................................................               ................................................  Date                                            Time    ..........................................................................................................................................  Reviewed by Signature/Title    ...................................................              ..............................................  Date                                                            Time

## 2018-08-22 NOTE — ANESTHESIA PREPROCEDURE EVALUATION
Anesthesia Evaluation     .             ROS/MED HX    ENT/Pulmonary:  - neg pulmonary ROS     Neurologic:  - neg neurologic ROS     Cardiovascular:  - neg cardiovascular ROS       METS/Exercise Tolerance:     Hematologic:  - neg hematologic  ROS       Musculoskeletal:  - neg musculoskeletal ROS       GI/Hepatic:  - neg GI/hepatic ROS       Renal/Genitourinary:  - ROS Renal section negative       Endo:  - neg endo ROS       Psychiatric:  - neg psychiatric ROS       Infectious Disease:  - neg infectious disease ROS       Malignancy:      - no malignancy   Other:    - neg other ROS                 Physical Exam  Normal systems: pulmonary and dental    Airway   Mallampati: I  TM distance: >3 FB  Neck ROM: full    Dental     Cardiovascular   Rhythm and rate: regular and normal      Pulmonary    breath sounds clear to auscultation                    Anesthesia Plan      History & Physical Review  History and physical reviewed and following examination; no interval change.    ASA Status:  1 emergent.    NPO Status:  > 8 hours    Plan for General and ETT with Intravenous induction. Maintenance will be Balanced.    PONV prophylaxis:  Ondansetron (or other 5HT-3) and Dexamethasone or Solumedrol  Plan: Gen with endotracheal tube.   Balanced anesthetic with propofol infusion for prophylactic PONV.      Postoperative Care  Postoperative pain management:  IV analgesics and Multi-modal analgesia.      Consents  Anesthetic plan, risks, benefits and alternatives discussed with:  Patient..                          .

## 2018-08-22 NOTE — DISCHARGE INSTRUCTIONS
Follow-up with ophthalmology as scheduled.      If you have significant headache, persistent vomiting, dizziness, or other concerns, return to the emergency department for re-evaluation.

## 2018-08-22 NOTE — ED TRIAGE NOTES
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

## 2018-08-22 NOTE — IP AVS SNAPSHOT
[facility] [address]    [address]    [place] [address]    Phone:  [phone]                                       After Visit Summary   8/22/2018    Willam Mills    MRN: [id number]           After Visit Summary Signature Page     I have received my discharge instructions, and my questions have been answered. I have discussed any challenges I see with this plan with the nurse or doctor.    ..........................................................................................................................................  Patient/Patient Representative Signature      ..........................................................................................................................................  Patient Representative Print Name and Relationship to Patient    ..................................................               ................................................  Date                                            Time    ..........................................................................................................................................  Reviewed by Signature/Title    ...................................................              ..............................................  Date                                                            Time

## 2018-08-22 NOTE — IP AVS SNAPSHOT
MRN:[id number]                      After Visit Summary   8/22/2018    Willam Mills    MRN: [id number]           Thank you!     Thank you for choosing [facility] for your care. Our goal is always to provide you with excellent care. Hearing back from our patients is one way we can continue to improve our services. Please take a few minutes to complete the written survey that you may receive in the mail after you visit with us. Thank you!        Patient Information     Date Of Birth          1990        Designated Caregiver       Most Recent Value    Caregiver    Will someone help with your care after discharge? yes    Name of designated caregiver Jessie    Phone number of caregiver [phone]    Caregiver address [address] [place]      About your hospital stay     You were admitted on:  August 22, 2018 You last received care in the:  [facility] [address] [facility]    You were discharged on:  August 23, 2018        Reason for your hospital stay       Admitted for observation post eye surgery                  Who to Call     For medical emergencies, please call 911.  For non-urgent questions about your medical care, please call your primary care provider or clinic, None  For questions related to your surgery, please call your surgery clinic        Attending Provider     Provider Norma Reddy MD Ophthalmology    Corbin Brand MD Internal Medicine       Primary Care Provider Fax #    Provider Not In System [phone]      After Care Instructions     Activity       Your activity upon discharge: per Ortho            Diet       Follow this diet upon discharge: Orders Placed This Encounter      Advance Diet as Tolerated: Regular Diet Adult            Monitor and record       Watch for increased pain, swelling, redness, discharge,  fever, chills, nausea, vomiting  If these symptoms occurs, please call your primary care or come to ED                  Follow-up Appointments      Adult [facility] Follow-up and recommended labs and tests       Follow up Ophthalmology     Appointments on [place] and/or [facility] (with [facility] or [facility] provider or service). Call [phone] if you haven't heard regarding these appointments within 7 days of discharge.                  Your next 10 appointments already scheduled     Aug 30, 2018  1:00 PM CDT   RETURN GENERAL with Edu Krishnamurthy MD   Eye Clinic ([facility])    [address] [address] [phone]            Aug 30, 2018  1:30 PM CDT   Post-Op with Norma Aleman MD   Eye Clinic ([facility])    [address] [id number] [phone]              Further instructions from your care team       Same-Day Surgery   Adult Discharge Orders & Instructions     For 24 hours after surgery:  1. Get plenty of rest.  A responsible adult must stay with you for at least 24 hours after you leave the hospital.   2. Pain medication can slow your reflexes. Do not drive or use heavy equipment.  If you have weakness or tingling, don't drive or use heavy equipment until this feeling goes away.  3. Mixing alcohol and pain medication can cause dizziness and slow your breathing. It can even be fatal. Do not drink alcohol while taking pain medication.  4. Avoid strenuous or risky activities.  Ask for help when climbing stairs.   5. You may feel lightheaded.  If so, sit for a few minutes before standing.  Have someone help you get up.   6. If you have nausea (feel sick to your stomach), drink only clear liquids such as apple juice, ginger ale, broth or 7-Up.  Rest may also help.  Be sure to drink enough fluids.  Move to a regular diet as you feel able. Take pain medications with a small amount of solid food, such as toast or crackers, to avoid nausea.   7. A slight fever is normal. Call the doctor if your fever is over  "100 F (37.7 C) (taken under the tongue) or lasts longer than 24 hours.  8. You may have a dry mouth, muscle aches, trouble sleeping or a sore throat.  These symptoms should go away after 24 hours.  9. Do not make important or legal decisions.   Pain Management:      1. Take pain medication (if prescribed) for pain as directed by your physician.        2. WARNING: If the pain medication you have been prescribed contains Tylenol  (acetaminophen), DO NOT take additional doses of Tylenol (acetaminophen).     Call your doctor for any of the followin.  Signs of infection (fever, growing tenderness at the surgery site, severe pain, a large amount of drainage or bleeding, foul-smelling drainage, redness, swelling).    2.  It has been over 8 to 10 hours since surgery and you are still not able to urinate (pee).    3.  Headache for over 24 hours.    4.  Numbness, tingling or weakness the day after surgery (if you had spinal anesthesia).  To contact a doctor, call _____________________________________ or:      [phone] and ask for the Resident On Call for:          __________________________________________ (answered 24 hours a day)      Emergency Department:  [facility]: [phone]  [facility]: [phone]               Rev. 10/2014       Pending Results     No orders found for last 3 day(s).            Statement of Approval     Ordered          18 0720  I have reviewed and agree with all the recommendations and orders detailed in this document.  EFFECTIVE NOW     Approved and electronically signed by:  Milad Roque MD             Admission Information     Date & Time Provider Department Dept. Phone    2018 Corbin Brand MD [facility] [phone]      Your Vitals Were     Blood Pressure Pulse Temperature Respirations Height Weight    133/75 (BP Location: Right arm) 53 96.6  F (35.9  C) (Oral) 16 1.88 m (6' 2.02\") 92.5 kg (203 lb 14.8 oz)    Pulse Oximetry " "BMI (Body Mass Index)                96% 26.17 kg/m2          [facility] Information     [facility] lets you send messages to your doctor, view your test results, renew your prescriptions, schedule appointments and more. To sign up, go to [email or web address] . Click on \"Log in\" on the left side of the screen, which will take you to the Welcome page. Then click on \"Sign up Now\" on the right side of the page.     You will be asked to enter the access code listed below, as well as some personal information. Please follow the directions to create your username and password.     Your access code is: [email or web address]  Expires: 2018  8:19 AM     Your access code will  in 90 days. If you need help or a new code, please call your [facility] clinic or [phone].        Care EveryWhere ID     This is your Care EveryWhere ID. This could be used by other organizations to access your [facility] medical records  [id number]        Equal Access to Services     SIVAN OLIVAS : Hadii kristian cotton hadheo Solinh, waaxda luqadaha, qaybta kaalmada adebrendon, duc randolph . So [facility] [phone].    ATENCIÓN: Si habla español, tiene a stacy disposición servicios gratuitos de asistencia lingüística. Llame al [phone].    We comply with applicable federal civil rights laws and [place] laws. We do not discriminate on the basis of race, color, national origin, age, disability, sex, sexual orientation, or gender identity.               Review of your medicines      START taking        Dose / Directions    erythromycin ophthalmic ointment   Commonly known as:  ROMYCIN   Used for:  Postoperative eye state        Dose:  0.5 inch   Place 0.5 inches into the right eye 3 times daily   Quantity:  1 Tube   Refills:  1       moxifloxacin 0.5 % ophthalmic solution   Commonly known as:  VIGAMOX   Used for:  Postoperative eye state        Dose:  1 drop   Place 1 drop into the right eye 4 times daily   Quantity:  1 Bottle "   Refills:  0         CONTINUE these medicines which may have CHANGED, or have new prescriptions. If we are uncertain of the size of tablets/capsules you have at home, strength may be listed as something that might have changed.        Dose / Directions    prednisoLONE acetate 1 % ophthalmic susp   Commonly known as:  PRED FORTE   This may have changed:    - how much to take  - when to take this   Used for:  Postoperative eye state        Dose:  1 drop   Place 1 drop into the right eye every 2 hours (while awake)   Quantity:  1 Bottle   Refills:  0         CONTINUE these medicines which have NOT CHANGED        Dose / Directions    atropine 1 % ophthalmic solution   Used for:  Postoperative eye state        Dose:  1-2 drop   Place 1-2 drops into the right eye 2 times daily   Quantity:  1 Bottle   Refills:  0       Multi-vitamin Tabs tablet        Dose:  1 tablet   Take 1 tablet by mouth daily   Refills:  0         STOP taking     ACETAMINOPHEN PO           oxyCODONE-acetaminophen 5-325 MG per tablet   Commonly known as:  PERCOCET                Where to get your medicines      These medications were sent to [facility] [address]  [address] [address]     Phone:  [phone]     atropine 1 % ophthalmic solution    erythromycin ophthalmic ointment    moxifloxacin 0.5 % ophthalmic solution    prednisoLONE acetate 1 % ophthalmic susp                Protect others around you: Learn how to safely use, store and throw away your medicines at www.disposemymeds.org.             Medication List: This is a list of all your medications and when to take them. Check marks below indicate your daily home schedule. Keep this list as a reference.      Medications           Morning Afternoon Evening Bedtime As Needed    atropine 1 % ophthalmic solution   Place 1-2 drops into the right eye 2 times daily   Last time this was given:  1 drop on 8/23/2018  9:41 AM             8am       12pm                   erythromycin ophthalmic ointment   Commonly known as:  ROMYCIN   Place 0.5 inches into the right eye 3 times daily            8am       12pm                   moxifloxacin 0.5 % ophthalmic solution   Commonly known as:  VIGAMOX   Place 1 drop into the right eye 4 times daily   Last time this was given:  1 drop on 8/23/2018  9:41 AM            8am       12pm       4pm                  Multi-vitamin Tabs tablet   Take 1 tablet by mouth daily            8am                       prednisoLONE acetate 1 % ophthalmic susp   Commonly known as:  PRED FORTE   Place 1 drop into the right eye every 2 hours (while awake)   Last time this was given:  1 drop on 8/23/2018  9:41 AM

## 2018-08-23 ENCOUNTER — OFFICE VISIT (OUTPATIENT)
Dept: OPHTHALMOLOGY | Facility: CLINIC | Age: 28
End: 2018-08-23
Attending: OPHTHALMOLOGY
Payer: OTHER MISCELLANEOUS

## 2018-08-23 ENCOUNTER — TELEPHONE (OUTPATIENT)
Dept: OPHTHALMOLOGY | Facility: CLINIC | Age: 28
End: 2018-08-23

## 2018-08-23 VITALS
HEIGHT: 74 IN | TEMPERATURE: 96.6 F | RESPIRATION RATE: 16 BRPM | OXYGEN SATURATION: 96 % | BODY MASS INDEX: 26.17 KG/M2 | DIASTOLIC BLOOD PRESSURE: 75 MMHG | SYSTOLIC BLOOD PRESSURE: 133 MMHG | WEIGHT: 203.93 LBS | HEART RATE: 53 BPM

## 2018-08-23 DIAGNOSIS — S05.32XD RUPTURED GLOBE OF LEFT EYE, SUBSEQUENT ENCOUNTER: ICD-10-CM

## 2018-08-23 DIAGNOSIS — Z98.890 POSTOPERATIVE EYE STATE: Primary | ICD-10-CM

## 2018-08-23 LAB — GLUCOSE BLDC GLUCOMTR-MCNC: 125 MG/DL (ref 70–99)

## 2018-08-23 PROCEDURE — 25000125 ZZHC RX 250: Performed by: OPHTHALMOLOGY

## 2018-08-23 PROCEDURE — 25000132 ZZH RX MED GY IP 250 OP 250 PS 637: Performed by: INTERNAL MEDICINE

## 2018-08-23 PROCEDURE — 99235 HOSP IP/OBS SAME DATE MOD 70: CPT | Performed by: INTERNAL MEDICINE

## 2018-08-23 PROCEDURE — 00000146 ZZHCL STATISTIC GLUCOSE BY METER IP

## 2018-08-23 PROCEDURE — 08Q6XZZ REPAIR RIGHT SCLERA, EXTERNAL APPROACH: ICD-10-PCS | Performed by: OPHTHALMOLOGY

## 2018-08-23 PROCEDURE — 08QL0ZZ REPAIR RIGHT EXTRAOCULAR MUSCLE, OPEN APPROACH: ICD-10-PCS | Performed by: OPHTHALMOLOGY

## 2018-08-23 PROCEDURE — 08JL0ZZ INSPECTION OF RIGHT EXTRAOCULAR MUSCLE, OPEN APPROACH: ICD-10-PCS | Performed by: OPHTHALMOLOGY

## 2018-08-23 PROCEDURE — 3E0C3GC INTRODUCTION OF OTHER THERAPEUTIC SUBSTANCE INTO EYE, PERCUTANEOUS APPROACH: ICD-10-PCS | Performed by: OPHTHALMOLOGY

## 2018-08-23 PROCEDURE — 08J1XZZ INSPECTION OF LEFT EYE, EXTERNAL APPROACH: ICD-10-PCS | Performed by: OPHTHALMOLOGY

## 2018-08-23 PROCEDURE — 25000125 ZZHC RX 250: Performed by: INTERNAL MEDICINE

## 2018-08-23 RX ORDER — ATROPINE SULFATE 10 MG/ML
1-2 SOLUTION/ DROPS OPHTHALMIC 2 TIMES DAILY
Qty: 1 BOTTLE | Refills: 0 | Status: SHIPPED | OUTPATIENT
Start: 2018-08-23 | End: 2018-09-13

## 2018-08-23 RX ORDER — OXYCODONE AND ACETAMINOPHEN 5; 325 MG/1; MG/1
1 TABLET ORAL EVERY 6 HOURS PRN
Qty: 20 TABLET | Refills: 0 | Status: SHIPPED | OUTPATIENT
Start: 2018-08-23 | End: 2018-09-13

## 2018-08-23 RX ORDER — PREDNISOLONE ACETATE 10 MG/ML
1 SUSPENSION/ DROPS OPHTHALMIC
Qty: 1 BOTTLE | Refills: 0 | Status: SHIPPED | OUTPATIENT
Start: 2018-08-23 | End: 2018-09-13

## 2018-08-23 RX ORDER — OXYCODONE AND ACETAMINOPHEN 5; 325 MG/1; MG/1
1 TABLET ORAL EVERY 6 HOURS PRN
Qty: 20 TABLET | Refills: 0 | Status: SHIPPED | OUTPATIENT
Start: 2018-08-23 | End: 2018-08-23

## 2018-08-23 RX ORDER — MOXIFLOXACIN 5 MG/ML
1 SOLUTION/ DROPS OPHTHALMIC 4 TIMES DAILY
Qty: 1 BOTTLE | Refills: 0 | Status: SHIPPED | OUTPATIENT
Start: 2018-08-23 | End: 2018-09-13

## 2018-08-23 RX ORDER — ERYTHROMYCIN 5 MG/G
0.5 OINTMENT OPHTHALMIC 3 TIMES DAILY
Qty: 1 TUBE | Refills: 1 | Status: SHIPPED | OUTPATIENT
Start: 2018-08-23 | End: 2019-03-11

## 2018-08-23 RX ADMIN — MOXIFLOXACIN HYDROCHLORIDE 1 DROP: 5 SOLUTION/ DROPS OPHTHALMIC at 09:41

## 2018-08-23 RX ADMIN — OXYCODONE HYDROCHLORIDE AND ACETAMINOPHEN 1 TABLET: 5; 325 TABLET ORAL at 00:34

## 2018-08-23 RX ADMIN — OXYCODONE HYDROCHLORIDE AND ACETAMINOPHEN 1 TABLET: 5; 325 TABLET ORAL at 06:35

## 2018-08-23 RX ADMIN — PREDNISOLONE ACETATE 1 DROP: 10 SUSPENSION/ DROPS OPHTHALMIC at 09:41

## 2018-08-23 RX ADMIN — ATROPINE SULFATE 1 DROP: 10 SOLUTION/ DROPS OPHTHALMIC at 09:41

## 2018-08-23 RX ADMIN — ACETAMINOPHEN 650 MG: 325 TABLET, FILM COATED ORAL at 03:25

## 2018-08-23 ASSESSMENT — TONOMETRY
OD_IOP_MMHG: 15
IOP_METHOD: TONOPEN

## 2018-08-23 ASSESSMENT — ACTIVITIES OF DAILY LIVING (ADL)
RETIRED_COMMUNICATION: 0-->UNDERSTANDS/COMMUNICATES WITHOUT DIFFICULTY
FALL_HISTORY_WITHIN_LAST_SIX_MONTHS: NO
AMBULATION: 0-->INDEPENDENT
TOILETING: 0-->INDEPENDENT
COGNITION: 0 - NO COGNITION ISSUES REPORTED
TRANSFERRING: 0-->INDEPENDENT
RETIRED_EATING: 0-->INDEPENDENT
ADLS_ACUITY_SCORE: 13
BATHING: 0-->INDEPENDENT
DRESS: 0-->INDEPENDENT
ADLS_ACUITY_SCORE: 9
ADLS_ACUITY_SCORE: 9
SWALLOWING: 0-->SWALLOWS FOODS/LIQUIDS WITHOUT DIFFICULTY

## 2018-08-23 ASSESSMENT — VISUAL ACUITY
METHOD: SNELLEN - LINEAR
OD_SC: LP

## 2018-08-23 NOTE — TELEPHONE ENCOUNTER
Attempted to reach Jessie but per message would not like detailed message left.  Dr. Madsen handed Jessie the prescription for 20 tabs of Percocet yesterday.  Can take Tylenol until seen.

## 2018-08-23 NOTE — TELEPHONE ENCOUNTER
Spoke to Jessie and stated below about being given Percocet prescription.  She stated that the hospital staff took that to make photocopies of it and they didn't get it back.  I stated they could contact them to see if they have, but would have to drive down to .  Our office is unable to write another prescription and can take over the counter Tylenol as needed for pain or contact PCP.

## 2018-08-23 NOTE — PROGRESS NOTES
CC: trauma right eye  HPI: Willam Mills is a  27 year old year-old male who sustained trauma to the right eye with a baseball in [place] evening of August 21st.     Postoperative day #1 s/p globe exploration and repair, right eye on 8/23/18.    He is doing well. Last night went okay. Significantly less pain when he moves his eyes. He does feel a scratchy sensation on the surface of his eye.     Assessment & Plan:  1) Open globe, right eye   - along with right orbital floor fracture (no muscle entrapment), hyphema, posterior segment hemorrhage, possible lens subluxation, RD   - Postoperative day#1 s/p exploration and repair on 8/22/18    - large >20mm scleral laceration found underneath lateral rectus muscle that extended to posteriorly (unable to close posterior scleral laceration completely)   - Globe and AC formed, IOP 15 today.     - Start prednisolone q2 hours    - start vigamox four times a day     - start erythromycing ointment three times a day   - start atrophine twice a day    - plastic shield over eye 24/7   - elevate head of head, limit activities.    - we discussed extensively that his visual prognosis is poor, that he may develop a blind painful eye that will require  enucleation in the future. Patient understands.      - follow up 1 week   - follow up 1 week retina      Complete documentation of historical and exam elements from today's encounter can be found in the full encounter summary report (not reduplicated in this progress note).  I personally obtained the chief complaint(s) and history of present illness.  I confirmed and edited as necessary the review of systems, past medical/surgical history, family history, social history, and examination findings as documented by others; and I examined the patient myself.  I personally reviewed the relevant tests, images, and reports as documented above.  I formulated and edited as necessary the assessment and plan and discussed the findings and  management plan with the patient and family. - Edu Krishnamurthy MD

## 2018-08-23 NOTE — PROGRESS NOTES
PGY-3 Brief Note    RIGHT globe ruptured with scleral laceration repaired in operating room. Posteriorly, the globe is still open. Will be admitted for observation tonight, plan for clinic appointment at [facility] at 8am tomorrow morning (9th floor on [address]).    No drops over night, keep eye shielded. Pain management as per primary team.    Sade Fink MD  PGY-3 Ophthalmology

## 2018-08-23 NOTE — MR AVS SNAPSHOT
After Visit Summary   2018    Willam Mills    MRN: [id number]           Patient Information     Date Of Birth          1990        Visit Information        Provider Department      2018 8:00 AM Edu Krishnamurthy MD [facility]        Today's Diagnoses     Postoperative eye state    -  1       Follow-ups after your visit        Your next 10 appointments already scheduled     Aug 30, 2018  1:00 PM CDT   RETURN GENERAL with Edu Krishnamurthy MD   Eye Clinic ([facility])    [address] [address]   [phone]            Aug 30, 2018  1:30 PM CDT   Post-Op with Norma Aleman MD   Eye Clinic ([facility])    [address] [address]   [phone]              Who to contact     Please call your clinic at [phone] to:    Ask questions about your health    Make or cancel appointments    Discuss your medicines    Learn about your test results    Speak to your doctor            Additional Information About Your Visit        Chance (app) Information     Chance (app) is an electronic gateway that provides easy, online access to your medical records. With Chance (app), you can request a clinic appointment, read your test results, renew a prescription or communicate with your care team.     To sign up for Chance (app) visit the website at [email or web address]   You will be asked to enter the access code listed below, as well as some personal information. Please follow the directions to create your username and password.     Your access code is: [email or web address]  Expires: 2018  8:19 AM     Your access code will  in 90 days. If you need help or a new code, please contact your [facility] or call [phone] for assistance.        Care EveryWhere ID     This is your Care EveryWhere ID. This could be used by other organizations to  access your [facility] medical records  [id number]         Blood Pressure from Last 3 Encounters:   08/23/18 146/67   08/22/18 114/63    Weight from Last 3 Encounters:   08/22/18 92.5 kg (203 lb 14.8 oz)   08/22/18 93 kg (205 lb)              Today, you had the following     No orders found for display         Today's Medication Changes      Notice     This visit is during an admission. Changes to the med list made in this visit will be reflected in the After Visit Summary of the admission.             Primary Care Provider Fax #    Provider Not In System [phone]                Equal Access to Services     [facility] : Marbin Colon, sis guerin, janak christensen, duc randolph . So [facility] [phone].    ATENCIÓN: Si habla español, tiene a stacy disposición servicios gratuitos de asistencia lingüística. Llame al [phone].    We comply with applicable federal civil rights laws and [place] laws. We do not discriminate on the basis of race, color, national origin, age, disability, sex, sexual orientation, or gender identity.            Thank you!     Thank you for choosing [facility]  for your care. Our goal is always to provide you with excellent care. Hearing back from our patients is one way we can continue to improve our services. Please take a few minutes to complete the written survey that you may receive in the mail after your visit with us. Thank you!             Your Updated Medication List - Protect others around you: Learn how to safely use, store and throw away your medicines at www.disposemymeds.org.      Notice     This visit is during an admission. Changes to the med list made in this visit will be reflected in the After Visit Summary of the admission.

## 2018-08-23 NOTE — TELEPHONE ENCOUNTER
Note to Dr. Krishnamurthy for f/u of pain medication request  Howard Rojo RN 12:46 PM 08/23/18

## 2018-08-23 NOTE — DISCHARGE INSTRUCTIONS
Same-Day Surgery   Adult Discharge Orders & Instructions     For 24 hours after surgery:  1. Get plenty of rest.  A responsible adult must stay with you for at least 24 hours after you leave the hospital.   2. Pain medication can slow your reflexes. Do not drive or use heavy equipment.  If you have weakness or tingling, don't drive or use heavy equipment until this feeling goes away.  3. Mixing alcohol and pain medication can cause dizziness and slow your breathing. It can even be fatal. Do not drink alcohol while taking pain medication.  4. Avoid strenuous or risky activities.  Ask for help when climbing stairs.   5. You may feel lightheaded.  If so, sit for a few minutes before standing.  Have someone help you get up.   6. If you have nausea (feel sick to your stomach), drink only clear liquids such as apple juice, ginger ale, broth or 7-Up.  Rest may also help.  Be sure to drink enough fluids.  Move to a regular diet as you feel able. Take pain medications with a small amount of solid food, such as toast or crackers, to avoid nausea.   7. A slight fever is normal. Call the doctor if your fever is over 100 F (37.7 C) (taken under the tongue) or lasts longer than 24 hours.  8. You may have a dry mouth, muscle aches, trouble sleeping or a sore throat.  These symptoms should go away after 24 hours.  9. Do not make important or legal decisions.   Pain Management:      1. Take pain medication (if prescribed) for pain as directed by your physician.        2. WARNING: If the pain medication you have been prescribed contains Tylenol  (acetaminophen), DO NOT take additional doses of Tylenol (acetaminophen).     Call your doctor for any of the followin.  Signs of infection (fever, growing tenderness at the surgery site, severe pain, a large amount of drainage or bleeding, foul-smelling drainage, redness, swelling).    2.  It has been over 8 to 10 hours since surgery and you are still not able to urinate (pee).    3.   Headache for over 24 hours.    4.  Numbness, tingling or weakness the day after surgery (if you had spinal anesthesia).  To contact a doctor, call _____________________________________ or:      [phone] and ask for the Resident On Call for:          __________________________________________ (answered 24 hours a day)      Emergency Department:  [facility]: [phone]  [facility]: [phone]               Rev. 10/2014

## 2018-08-23 NOTE — TELEPHONE ENCOUNTER
[facility] Call Center    Phone Message    May a detailed message be left on voicemail: no    Reason for Call: Other: Pt's girlfriend, Jessie, called stating that they thought pt was going to be given Rx for percocet, but they did not get one per Jessie. They are wondering if they can get an Rx, or what the pt should do when he has pain per Jessie. Please call jessie back to discuss options. Thanks     Action Taken: Message routed to:  [facility]

## 2018-08-23 NOTE — PLAN OF CARE
Problem: Patient Care Overview  Goal: Plan of Care/Patient Progress Review  Outcome: Improving  Awake,alert,orientated x4.R eye shield,CDI.Reports some pain on R eye,was given percoset tablet.Diet advanced to regular,tolerating vanilla pudding and apple juice with no nausea/emesis.PIV line VSS.LS clear,satts well at room air.Other VS WNL.Is on bedrest with BR privilege for tonight.Pt has an eye appt at 8AM in [place] at [facility],9th floor.Ophthalmologist and moonlighter aware.Per rudy,ok to have pt come back before discharged.Wife in room,very much involved with pt's cares.

## 2018-08-23 NOTE — OR NURSING
PACU to Inpatient Nursing Handoff    Patient Willam Mills is a 27 year old male who speaks English.   Procedure Procedure(s):  Right Globe Exploration and Repair, Left Eye Exam Under Anesthesia  - Wound Class: I-Clean   - Wound Class: I-Clean   Surgeon(s) Primary: Edu Krishnamurthy MD     No Known Allergies    Isolation  [unfilled]     Past Medical History   has no past medical history on file.    Anesthesia General   Dermatome Level     Preop Meds Not applicable   Nerve block Not applicable   Intraop Meds dexamethasone (Decadron)  fentanyl (Sublimaze): 300 mcg total  hydromorphone (Dilaudid): 1.0 mg total  ondansetron (Zofran): last given at 2022   Local Meds No   Antibiotics Not applicable     Pain Patient Currently in Pain: yes  Comfort: tolerable with discomfort  Pain Control: partially effective   PACU meds  oxycodone (Roxicodone): 5 mg (total dose) last given at 2010    PCA / epidural No   Capnography     Telemetry ECG Rhythm: Normal sinus rhythm   Inpatient Telemetry Monitor Ordered? No        Labs Glucose No results found for: GLC    Hgb No results found for: HGB    INR No results found for: INR   PACU Imaging Not applicable     Wound/Incision Incision/Surgical Site 08/22/18 Right Eye (Active)   Incision Assessment UTV 8/22/2018  9:00 PM   Closure Approximated;Sutures 8/22/2018  9:00 PM   Dressing Intervention Clean, dry, intact 8/22/2018  9:00 PM   Number of days:0      CMS Peripheral Neurovascular WDL: WDL (08/22/18 1600)      Equipment Not applicable   Other LDA       IV Access Peripheral IV 08/22/18 Right Hand (Active)   Site Assessment WDL 8/22/2018  9:00 PM   Line Status Infusing 8/22/2018  9:00 PM   Phlebitis Scale 0-->no symptoms 8/22/2018  9:00 PM   Infiltration Scale 0 8/22/2018  9:00 PM   Number of days:0      Blood Products Not applicable EBL 0 mL   Intake/Output Date 08/22/18 0700 - 08/23/18 0659   Shift 2961-2586 3947-6754 2307-1254 24 Hour Total   I  N  T  A  K  E   I.V.  1000  1000    Shift  Total  (mL/kg)  1000  (10.81)  1000  (10.81)   O  U  T  P  U  T   Urine  750  750    Shift Total  (mL/kg)  750  (8.11)  750  (8.11)   Weight (kg)  92.5 92.5 92.5        Drains / Higgins     Time of void PreOp Void Prior to Procedure: 1535 (08/22/18 1549)    PostOp Voided (mL): 550 mL (08/22/18 2130)  Urine Occurrence: 1 (08/22/18 2153)    Diapered? No   Bladder Scan     PO    tolerating sips and crackers     Vitals    B/P: 139/82  T: 99.1  F (37.3  C)    Temp src: Oral  P:       Heart Rate: 64 (08/22/18 2215)     R: 10  O2:  SpO2: 100 %    O2 Device: None (Room air) (08/22/18 2215)    Oxygen Delivery: 7 LPM (08/22/18 2115)         Family/support present significant other   Patient belongings Patient Belongings: plastic bag   Patient transported on cart   DC meds/scripts (obs/outpt) Yes, meds   Inpatient Pain Meds Released? Yes       Special needs/considerations None   Tasks needing completion None       Henry Zepeda RN  ASCOM [id number]

## 2018-08-23 NOTE — ANESTHESIA POSTPROCEDURE EVALUATION
Patient: Willam Mills    Procedure(s):  Right Globe Exploration and Repair, Left Eye Exam Under Anesthesia  - Wound Class: I-Clean   - Wound Class: I-Clean    Diagnosis:see H&P  Diagnosis Additional Information: No value filed.    Anesthesia Type:  General, ETT    Note:  Anesthesia Post Evaluation    Patient location during evaluation: PACU  Patient participation: Able to fully participate in evaluation  Level of consciousness: awake  Pain management: adequate  Airway patency: patent  Cardiovascular status: acceptable  Respiratory status: acceptable  Hydration status: acceptable  PONV: none             Last vitals:  Vitals:    08/22/18 2100 08/22/18 2115 08/22/18 2130   BP: 126/68 110/66 146/84   Resp: 25 19 8   Temp:   37.3  C (99.1  F)   SpO2: 100% 100% 98%         Electronically Signed By: Akhil Hills DO  August 22, 2018  9:38 PM

## 2018-08-23 NOTE — ANESTHESIA CARE TRANSFER NOTE
Patient: Willam Mills    Procedure(s):  Right Globe Exploration and Repair, Left Eye Exam Under Anesthesia  - Wound Class: I-Clean   - Wound Class: I-Clean    Diagnosis: see H&P  Diagnosis Additional Information: No value filed.    Anesthesia Type:   General, ETT     Note:  Airway :Face Mask  Patient transferred to:PACU  Handoff Report: Identifed the Patient, Identified the Reponsible Provider, Reviewed the pertinent medical history, Discussed the surgical course, Reviewed Intra-OP anesthesia mangement and issues during anesthesia, Set expectations for post-procedure period and Allowed opportunity for questions and acknowledgement of understanding      Vitals: (Last set prior to Anesthesia Care Transfer)    CRNA VITALS  8/22/2018 2026 - 8/22/2018 2059 8/22/2018             Resp Rate (observed): (!)  1                Electronically Signed By: ROBIN Chase CRNA  August 22, 2018  8:59 PM

## 2018-08-23 NOTE — PLAN OF CARE
Problem: Patient Care Overview  Goal: Plan of Care/Patient Progress Review  Outcome: No Change  Pt arrived on unit from PACU by cart at 22:40. Alert and oriented x4.  Able to to walk from cart to bed.  Denies nausea, dizziness, CP or SOB. VSS and afebrile.  sats high 90's on RA.  Rt eye covered by eye shield.  Elevated HOB >30 degree.  IVF infusing at 100ml/hr via Rt hand PIV.  C/o Rt eye pain  But no med needed per pt.  Oriented room and call light.   Urinal at bedside.  Wife staying with pt in room.  Will give report to night nurse.

## 2018-08-23 NOTE — H&P
[facility] History and Physical    Willam Mills MRN# [id number]   Age: 27 year old YOB: 1990     Date of Admission:  8/22/2018              Impression and Plan:     28 y/o healthy male, s/p Right eye globus rupture (with possible orbital fracture) s/p repair    R eye injury: Post trauma with baseball. Ophto performed rupture repair, would like to keep him ON and see him in clinic  In AM . Unclear if patient took tetanus shot, but will di/w ophto in AM. Per not got IV moxi this AM, covered for 24 hours, will discuss with ophto if need to continue    Pain control: R  Ye post surgery. will give him prn percocet, alternating with tylenol. Will give prn zofran for nausea    Ppx: no need for heparin, short stay, no ppi               Chief Complaint:   Eye injury     History is obtained from the patient          History of Present Illness:   This patient is a 27 year old  male who presents with eye injury post op. Pt [place], got a direct hit/trauma with a ball in the R eye. Was sent here and evaluated, found to have R globe rupture with possible orbital fracture. Underwent repair today by surgery. Pt feels fine, only minor pain when he moves the Left eye and consequently the r eye.  Denies CP, SOB, abd pain. Is concerned about vomiting, and would like nausea meds in case he gets nauseated. Pt denies any other issue.   She drinks occasional beers 2-3 times a week, occasional marijuana, no cigarettes.               Past Medical History:   No past medical history on file.          Past Surgical History:    History reviewed. No pertinent surgical history.          Social History:     Social History   Substance Use Topics     Smoking status: Light Tobacco Smoker     Smokeless tobacco: Never Used      Comment: Patient states cigars     Alcohol use 1.2 oz/week     2 Cans of beer per week    lives in [place], plays baseball professionally           Family History:   Father with DM         Immunizations:     There is no immunization history on file for this patient.          Allergies:   No Known Allergies          Medications:     Prescriptions Prior to Admission   Medication Sig Dispense Refill Last Dose     ACETAMINOPHEN PO Take 1,000 mg by mouth every 6 hours as needed for pain   8/22/2018 at 1400     multivitamin, therapeutic with minerals (MULTI-VITAMIN) TABS tablet Take 1 tablet by mouth daily   Past Week at Unknown time     atropine 1 % ophthalmic solution Place 1-2 drops into the right eye 2 times daily 1 Bottle 11      oxyCODONE-acetaminophen (PERCOCET) 5-325 MG per tablet Take 1 tablet by mouth every 6 hours as needed for pain 20 tablet 0      prednisoLONE acetate (PRED FORTE) 1 % ophthalmic susp Place 1-2 drops into the right eye every 2 hours 1 Bottle 0              Review of Systems:   CONSTITUTIONAL: NEGATIVE for fever, chills, change in weight  EYES: pain on r eye, s/p surgery  ENT/MOUTH: NEGATIVE for ear, mouth and throat problems  RESP: NEGATIVE for significant cough or SOB  CV: NEGATIVE for chest pain, palpitations or peripheral edema  GI: NEGATIVE for nausea, abdominal pain, heartburn, or change in bowel habits  MUSCULOSKELETAL: NEGATIVE for significant arthralgias or myalgia  NEURO: NEGATIVE for weakness, dizziness or paresthesias           Physical Exam:   Vitals were reviewed  Temp: 99.1  F (37.3  C) Temp src: Oral BP: 139/82   Heart Rate: 64 Resp: 10 SpO2: 100 % O2 Device: None (Room air) Oxygen Delivery: 7 LPM  NAD  ENT: moist oral mucosa, patch covering R eye, post op  Neck: no JVd  CV: rrr no murmurs  L: CTAB  A: soft nt nd  E: no edema or cyanosis  Skin: no jaundice or rash, multiple tattoos.  Neuro: aaox3, strength 5/5 4 extr, sensation conserved               Data:   CT scan reviewed, see EPIC  No labs      Corbin Brand MD

## 2018-08-23 NOTE — BRIEF OP NOTE
[facility] Brief Operative Note    Pre-operative diagnosis: see H&P   Post-operative diagnosis * No post-op diagnosis entered *   Procedure: Procedure(s):  Right Globe Exploration and Repair  - Wound Class: I-Clean   Surgeon:   Edu Krishnamurthy MD     Assistants(s): Sade Fink MD   Estimated blood loss: Minimal    Specimens: None   Findings: 2cm laceration under the lateral rectus muscle extending posteriorly, posterior edge not able to be identified.

## 2018-08-23 NOTE — DISCHARGE SUMMARY
Discharge Summary    Willam Mills MRN# [id number]   YOB: 1990 Age: 27 year old     Date of Admission:  8/22/2018  Date of Discharge:  8/23/2018 11:45 AM  Admitting Physician:  Corbin Brand MD  Discharge Physician:  Milad Roque MD   Discharging Service:  Internal Medicine     Primary Provider: System, Provider Not In          Discharge Diagnosis:       s/p Right eye globus rupture (with possible orbital fracture) s/p repair              Brief History of Illness:     Willam Mills is a 27 year old male who was admitted for observation post eye surgery    For more details, please refer to the admission H&P on 8/22/2018             Hospital Course:     26 y/o healthy male, s/p Right eye globus rupture (with possible orbital fracture) s/p repair  Evaluated by Ophthlamology in the clinic. Case disucssed with Ophthalmology team.  Ophthalmology will provided necessary prescripitions       Discharge Medications:     Discharge Medication List as of 8/23/2018  9:50 AM      CONTINUE these medications which have NOT CHANGED    Details   multivitamin, therapeutic with minerals (MULTI-VITAMIN) TABS tablet Take 1 tablet by mouth daily, Historical      atropine 1 % ophthalmic solution Place 1-2 drops into the right eye 2 times daily, Disp-1 Bottle, R-0, E-Prescribe      erythromycin (ROMYCIN) ophthalmic ointment Place 0.5 inches into the right eye 3 times dailyDisp-1 Tube, [id number]      moxifloxacin (VIGAMOX) 0.5 % ophthalmic solution Place 1 drop into the right eye 4 times daily, Disp-1 Bottle, R-0, E-Prescribe      prednisoLONE acetate (PRED FORTE) 1 % ophthalmic susp Place 1 drop into the right eye every 2 hours (while awake), Disp-1 Bottle, R-0, E-Prescribe         STOP taking these medications       ACETAMINOPHEN PO Comments:   Reason for Stopping:         oxyCODONE-acetaminophen (PERCOCET) 5-325 MG per tablet Comments:   Reason for Stopping:                   Procedures/Consultations:   No procedures  "performed during this admission  Consultation during this admission received from Ophthalmology           Final Day of Progress before Discharge:       Reports doing well  No acute issues  No chest pain,shorntess of breath, nausea, vomiting, lightheadedness or dizziness  No fever, chills  Physical Exam:  Blood pressure 133/75, pulse 53, temperature 96.6  F (35.9  C), temperature source Oral, resp. rate 16, height 1.88 m (6' 2.02\"), weight 92.5 kg (203 lb 14.8 oz), SpO2 96 %.      Alert, awake,and oriented  Right eye shade and dressing in place  S1, S2 normal  B/L CTA  Soft, NT, BS+  No pretibial edema    Data:  All laboratory data reviewed             Condition on Discharge:   Discharge condition: Stable   Code status on discharge: Full Code                Discharge Disposition:   Discharged to home               Pending Results:   Unresulted Labs Ordered in the Past 30 Days of this Admission     No orders found for last 61 day(s).                  Discharge Instructions and Follow-Up:     Discharge Procedure Orders  Reason for your hospital stay   Order Comments: Admitted for observation post eye surgery     Adult [facility] Follow-up and recommended labs and tests   Order Comments: Follow up Ophthalmology     Appointments on [place] and/or [facility] (with [facility] or [facility] provider or service). Call [phone] if you haven't heard regarding these appointments within 7 days of discharge.     Activity   Order Comments: Your activity upon discharge: per Ortho   Order Specific Question Answer Comments   Is discharge order? Yes      Monitor and record   Order Comments: Watch for increased pain, swelling, redness, discharge,  fever, chills, nausea, vomiting  If these symptoms occurs, please call your primary care or come to ED     Full Code     Diet   Order Comments: Follow this diet upon discharge: Orders Placed This Encounter     Advance Diet as Tolerated: Regular Diet Adult   Order Specific Question Answer " Comments   Is discharge order? Yes             Attestation:  Milad Roque.    Time spent on patient: 25 minutes total including face to face and coordinating care time reviewing current illness, any medication changes, and the care plan for today.

## 2018-08-23 NOTE — OP NOTE
Operative Note  Preoperative Diagnosis:   1) Right orbital trauma with anterior chamber hyphema, choroidal hemorrhage, vitreous hemorrhage, and possible retinal detachment   2) Right inferior orbital floor fracture  3) Right lamina papyracea fracture  Postoperative Diagnosis:   1) Right globe rupture with anterior chamber hyphema, choroidal hemorrhage, vitreous hemorrhage, and possible retinal detachment   2) Right inferior orbital floor fracture  3) Right lamina papyracea fracture  Procedure:   1) Ruptured globe exploration and repair, right   2) dilated exam under anesthesia, left   Surgeon: Edu Krishnamurthy MD   Assistant: Sade Fink MD  Anesthesia: General anesthesia  Implant: none  Complications: none   ESTIMATED BLOOD LOSS: less than 3cc   HISTORY AND INDICATIONS: Willam Mills is a 27 year old male who sustained an orbital trauma of his right globe from a baseball. After the risks, benefits and alternatives to this procedure were explained (including complete loss of vision and possible need for future enucleation), informed consent was obtained and he elected to proceed with surgery emergently.   DESCRIPTION OF PROCEDURE: Willam Mills was brought to the operating room and placed supine on the operating table. General anesthesia was induced. Phenylephrine eye drops were placed into the left eye to dilate the eye.   Attention was directed to the right side. Of note, there was significant periorbital ecchymosis and edema. An eyelid speculum was placed, carefully, with care not to put undue pressure on the globe. There was significant hemorrhagic chemosis 360. Next, 360 degree conjunctival peritomy was made using Jase scissors. Bipolar cautery was used to reduce the bleeding conjunctival vessels. Attention was first directed to the medial rectus muscle. The superonasal and inferonasal quadrants were dissected and the medial rectus muscle was isolated using a [place] muscle hook. The sclera within the quadrants  were intact. Next, the medial rectus muscle was freed from Tenon's capsule with blunt dissection using Jase scissors and cotton swabs. The sclera around and underneath the medial rectus muscle was explored and found to be also intact.   Attention was then directed to the superior rectus, which was isolated in a similar fashion. The quadrants and sclera around and underneath the superior rectus muscle was explored and found to be intact. Attention was then directed to the lateral rectus muscle, which was isolated in a similar fashion. There was a laceration was found underneath the inferior portion of the lateral rectus muscle (at approximately 5-7 mm posterior to the insertion site) extending past the inferior oblique muscle. A double armed 6-0 Vicryl suture was used to imbricate the lateral rectus muscle, and it was disinserted from its origin.  The scleral laceration was followed as far posteriorly as possible without torting the globe and expelling ocular contents. The scleral laceration was at least 20mm in length. The posterior edge was not able to be visualized. There was uveal prolapse and vitreous hemorrhage at the wound site. Attempts were made to replace uveal tissue into the eye. A 8-0 nylon suture was used to close the scleral edges in an interrupted fashion. 7 interrupted sutures were placed for approximately 15mm of the laceration. Unfortunately, posterior limit of the sclera was not able to be visualized. The lateral rectus was then re-attached to its insertion site using both arms of the 6-0 vicryl suture passed partial thickness through the sclera.   Attention was then directed to the inferior rectus muscle, which was isolated using [place] hook. The sclera around and underneath the inferior rectus was explored and found to be intact.  The inferior rectus muscle does not appear to be entrapped. The conjunctiva was then re-opposed and sutured to the limbus with  6-0 plain gut sutures. BSS,  intravitreal ceftazidime and vancomycin were injected into the vitreous cavity. The globe was noted to be formed. Subconjunctival cefazolin and decadron were injected as well. The lid speculum was then carefully removed from the eye. Atropine eye drops and tobradex ointment was applied to the eye. The eye was then patched and shielded.  Attention was then directed to the left eye. The pupil was small so another drop of phenylephrine was placed to dilate the eye. A careful dilated retinal examination was performed, which was notable for a large cup : disc size but normal optic nerve, macula, and periphery.   The patient tolerated the procedure well, and he left the operating room in stable condition.   Dr. Krishnamurthy was present during the entire procedure.     Sade Fink MD

## 2018-08-23 NOTE — PLAN OF CARE
Problem: Patient Care Overview  Goal: Plan of Care/Patient Progress Review  Pt. discharged at 1135 to home, was accompanied by significant other, and left with personal belongings. Pt. received complete discharge paperwork and medications as filled by discharge pharmacy. Pt. was given times of last dose for all discharge medications in writing on discharge medication sheets. Discharge teaching included medication administration, activity restrictions, and signs and symptoms of infection. Pt. to follow up with opthalmolgy in a week. Pt. had no further questions at the time of discharge and no unmet needs were identified.

## 2018-08-30 ENCOUNTER — OFFICE VISIT (OUTPATIENT)
Dept: OPHTHALMOLOGY | Facility: CLINIC | Age: 28
End: 2018-08-30
Attending: OPHTHALMOLOGY
Payer: OTHER MISCELLANEOUS

## 2018-08-30 DIAGNOSIS — S05.32XD RUPTURED GLOBE OF LEFT EYE, SUBSEQUENT ENCOUNTER: Primary | ICD-10-CM

## 2018-08-30 DIAGNOSIS — S05.91XS RIGHT EYE INJURY, SEQUELA: ICD-10-CM

## 2018-08-30 PROCEDURE — G0463 HOSPITAL OUTPT CLINIC VISIT: HCPCS | Mod: ZF

## 2018-08-30 PROCEDURE — 76512 OPH US DX B-SCAN: CPT | Mod: ZF | Performed by: OPHTHALMOLOGY

## 2018-08-30 RX ORDER — PREDNISOLONE ACETATE 10 MG/ML
1-2 SUSPENSION/ DROPS OPHTHALMIC 4 TIMES DAILY
Qty: 1 BOTTLE | Refills: 3 | Status: SHIPPED | OUTPATIENT
Start: 2018-08-30 | End: 2019-03-11

## 2018-08-30 RX ORDER — OXYCODONE AND ACETAMINOPHEN 5; 325 MG/1; MG/1
1 TABLET ORAL EVERY 6 HOURS PRN
Qty: 28 TABLET | Refills: 0 | Status: SHIPPED | OUTPATIENT
Start: 2018-08-30 | End: 2018-09-13

## 2018-08-30 RX ORDER — MOXIFLOXACIN 5 MG/ML
1 SOLUTION/ DROPS OPHTHALMIC 4 TIMES DAILY
Qty: 1 BOTTLE | Refills: 3 | Status: SHIPPED | OUTPATIENT
Start: 2018-08-30 | End: 2019-03-11

## 2018-08-30 RX ORDER — ATROPINE SULFATE 10 MG/ML
1-2 SOLUTION/ DROPS OPHTHALMIC DAILY
Qty: 1 BOTTLE | Refills: 1 | Status: SHIPPED | OUTPATIENT
Start: 2018-08-30 | End: 2019-03-11

## 2018-08-30 ASSESSMENT — VISUAL ACUITY
METHOD: SNELLEN - LINEAR
OS_SC: 20/20
OD_SC: HM ECC

## 2018-08-30 ASSESSMENT — TONOMETRY
OS_IOP_MMHG: 21
IOP_METHOD: TONOPEN
OD_IOP_MMHG: 23

## 2018-08-30 NOTE — PROGRESS NOTES
CC: trauma right eye s/p 8-22-18  HPI: Willam Mills is a  27 year old year-old patient with history of trauma to the right eye with a baseball yesterday in [place]. Referred here for management    Retinal Imaging:  bscan right eye 08/30/18  Disorganized internal and globe contents. Vitreous hemorrhage, severe chorioretinal / scleral elevation more pronounced inferiorly and temporally, likely retinal detachment superiorly at least three clock hours, possible choroidals  Site of scleral rupture with vitreous incarceration noted temporally (?choroidals also) at equator or anterior to equator, question of wound closing is difficult to ascertain on U/S.     Assessment & Plan:  1- trauma right eye   - status post open globe repair 8.22.18   - prescribe pain meds percocet 5/325 mg four times a day as needed for pain 9total of 28)   - Plan vitrectomy surgery (25 g PPV/possSB/ possibly lensectomy/ retinectomy/ choroidal drainage EL/FAX/SO vs gas )  General anesthesia; 2.5 hr surgery     2- orbital floor fx   - seen on CT scan    - no obvious pneumo-orbit by palpation or on CT   - pain to palpation of the infraorbital rim   - decreased V2 sensation    - patient with improvement of eye movement    -  oculoplastics previously consulted- no need to repair     I discussed the risks, benefits, and alternatives of retinal detachment surgery with the patient.  I explained that with surgery there was approximately a 70 to 80 percent chance of success and that the surgery might fail due to formation of fibrosis or other postoperative problems.  I explained that if the surgery failed, additional surgeries might be needed. I explained that retinal detachments cause vision loss and that even with a successful result from the surgery, the vision might not return to its prior level.  I explained that if there were subsequent re-detachments, even more vision might be lost.  I explained that with a gas bubble in the eye, a particular  head position after surgery including face-down might be necessary for a few weeks after surgery.  I also explained that airplane travel or high altitudes would have to be avoided for up to three months after surgery.  I explained that an oil bubble could be placed instead, and that it would not require such strict positioning or travel restrictions. However, an oil bubble would require further surgeries to be removed.  I explained that with a scleral buckle around the eye, double vision may develop after surgery.  This double vision usually resolves, but it might require either special glasses, further surgeries, or could even be intractable and permanent.  I explained that the surgery would accelerate the development of their cataract and that they would need cataract surgery much sooner than they would otherwise have needed it.  I explained there ischance I might have to remove their lens during my surgery.  I explain about his guarded visual prognosis given the extension of the eye injury.   After explaining all risks, benefits and alternatives of the surgery including but not limited to endophthalmitis, retina detachment and cataract the patient elected to proceed.      ~~~~~~~~~~~~~~~~~~~~~~~~~~~~~~~~~~   Complete documentation of historical and exam elements from today's encounter can be found in the full encounter summary report (not reduplicated in this progress note).  I personally obtained the chief complaint(s) and history of present illness.  I confirmed and edited as necessary the review of systems, past medical/surgical history, family history, social history, and examination findings as documented by others; and I examined the patient myself.  I personally reviewed the relevant tests, images, and reports as documented above.  I personally reviewed the ophthalmic test(s) associated with this encounter, agree with the interpretation(s) as documented by the resident/fellow, and have edited the  corresponding report(s) as necessary.   I formulated and edited as necessary the assessment and plan and discussed the findings and management plan with the patient and family    Norma Aleman MD  .  Retina Service   Department of Ophthalmology and Visual Neurosciences   [facility]  Phone: [phone]   Fax: [phone]

## 2018-08-30 NOTE — NURSING NOTE
Chief Complaints and History of Present Illnesses   Patient presents with     Post Op (Ophthalmology) Right Eye     Postoperative eye state (Primary Dx); Ruptured globe of left...     HPI    Affected eye(s):  Right   Symptoms:     No blurred vision   No decreased vision   No floaters   Flashes (Comment: OD intermittently)      Duration:  7 weeks   Frequency:  Constant       Do you have eye pain now?:  No      Comments:  Pt stated right eye symptoms have improved over the last 7 days   prednisolone q2 hours   vigamox four times a day     erythromycing ointment three times a day    atrophine twice a day   Mg Newell  1:45 PM August 30, 2018

## 2018-08-30 NOTE — MR AVS SNAPSHOT
After Visit Summary   2018    Willam Mills    MRN: [id number]           Patient Information     Date Of Birth          1990        Visit Information        Provider Department      2018 1:30 PM Norma Aleman MD [facility]        Today's Diagnoses     Ruptured globe of left eye, subsequent encounter - Right Eye    -  1    Right eye injury, sequela           Follow-ups after your visit        Follow-up notes from your care team     Return in about 1 week (around 2018).      Your next 10 appointments already scheduled     Sep 06, 2018  7:30 AM CDT   Post-Op with Norma Aleman MD   Eye Clinic ([facility])    [address]  9[facility] [address] [place] [id number] [phone]              Who to contact     Please call your clinic at [phone] to:    Ask questions about your health    Make or cancel appointments    Discuss your medicines    Learn about your test results    Speak to your doctor            Additional Information About Your Visit        MyChart Information     [employer] is an electronic gateway that provides easy, online access to your medical records. With [employer], you can request a clinic appointment, read your test results, renew a prescription or communicate with your care team.     To sign up for [employer] visit the website at [email or web address]   You will be asked to enter the access code listed below, as well as some personal information. Please follow the directions to create your username and password.     Your access code is: [email or web address]  Expires: 2018  8:19 AM     Your access code will  in 90 days. If you need help or a new code, please contact your [facility] or call [phone] for assistance.        Care EveryWhere ID     This is your Care EveryWhere ID. This could be used by other organizations to access your [facility]  records  [id number]         Blood Pressure from Last 3 Encounters:   08/23/18 133/75   08/22/18 114/63    Weight from Last 3 Encounters:   08/22/18 92.5 kg (203 lb 14.8 oz)   08/22/18 93 kg (205 lb)              We Performed the Following     Stephanie-Operative Worksheet (Retina)     Ultrasound B-scan OD (right eye)          Today's Medication Changes          These changes are accurate as of 8/30/18  4:15 PM.  If you have any questions, ask your nurse or doctor.               These medicines have changed or have updated prescriptions.        Dose/Directions    * atropine 1 % ophthalmic solution   This may have changed:  Another medication with the same name was added. Make sure you understand how and when to take each.   Used for:  Postoperative eye state   Changed by:  Norma Aleman MD        Dose:  1-2 drop   Place 1-2 drops into the right eye 2 times daily   Quantity:  1 Bottle   Refills:  0       * atropine 1 % ophthalmic solution   This may have changed:  You were already taking a medication with the same name, and this prescription was added. Make sure you understand how and when to take each.   Used for:  Ruptured globe of left eye, subsequent encounter   Changed by:  Norma Aleman MD        Dose:  1-2 drop   Place 1-2 drops into the right eye daily   Quantity:  1 Bottle   Refills:  1       * moxifloxacin 0.5 % ophthalmic solution   Commonly known as:  VIGAMOX   This may have changed:  Another medication with the same name was added. Make sure you understand how and when to take each.   Used for:  Postoperative eye state   Changed by:  Norma Aleman MD        Dose:  1 drop   Place 1 drop into the right eye 4 times daily   Quantity:  1 Bottle   Refills:  0       * moxifloxacin 0.5 % ophthalmic solution   Commonly known as:  VIGAMOX   This may have changed:  You were already taking a medication with the same name, and this prescription was added. Make sure you understand how and  when to take each.   Used for:  Ruptured globe of left eye, subsequent encounter   Changed by:  Norma Aleman MD        Dose:  1 drop   Place 1 drop into the right eye 4 times daily   Quantity:  1 Bottle   Refills:  3       * oxyCODONE-acetaminophen 5-325 MG per tablet   Commonly known as:  PERCOCET   This may have changed:  Another medication with the same name was added. Make sure you understand how and when to take each.   Used for:  Right eye injury, sequela   Changed by:  Norma Aleman MD        Dose:  1 tablet   Take 1 tablet by mouth every 6 hours as needed for pain   Quantity:  20 tablet   Refills:  0       * oxyCODONE-acetaminophen 5-325 MG per tablet   Commonly known as:  PERCOCET   This may have changed:  You were already taking a medication with the same name, and this prescription was added. Make sure you understand how and when to take each.   Used for:  Ruptured globe of left eye, subsequent encounter   Changed by:  Norma Aleman MD        Dose:  1 tablet   Take 1 tablet by mouth every 6 hours as needed for pain   Quantity:  28 tablet   Refills:  0       * prednisoLONE acetate 1 % ophthalmic susp   Commonly known as:  PRED FORTE   This may have changed:  Another medication with the same name was added. Make sure you understand how and when to take each.   Used for:  Postoperative eye state   Changed by:  Norma Aleman MD        Dose:  1 drop   Place 1 drop into the right eye every 2 hours (while awake)   Quantity:  1 Bottle   Refills:  0       * prednisoLONE acetate 1 % ophthalmic susp   Commonly known as:  PRED FORTE   This may have changed:  You were already taking a medication with the same name, and this prescription was added. Make sure you understand how and when to take each.   Used for:  Ruptured globe of left eye, subsequent encounter   Changed by:  Norma Aleman MD        Dose:  1-2 drop   Place 1-2 drops into the right eye 4 times daily    Quantity:  1 Bottle   Refills:  3       * Notice:  This list has 8 medication(s) that are the same as other medications prescribed for you. Read the directions carefully, and ask your doctor or other care provider to review them with you.         Where to get your medicines      These medications were sent to [facility] [address] [place] [place] [address]  [address] [place] [address]     Phone:  [phone]     atropine 1 % ophthalmic solution    moxifloxacin 0.5 % ophthalmic solution    prednisoLONE acetate 1 % ophthalmic susp         Some of these will need a paper prescription and others can be bought over the counter.  Ask your nurse if you have questions.     Bring a paper prescription for each of these medications     oxyCODONE-acetaminophen 5-325 MG per tablet               Information about OPIOIDS     PRESCRIPTION OPIOIDS: WHAT YOU NEED TO KNOW   We gave you an opioid (narcotic) pain medicine. It is important to manage your pain, but opioids are not always the best choice. You should first try all the other options your care team gave you. Take this medicine for as short a time (and as few doses) as possible.    Some activities can increase your pain, such as bandage changes or therapy sessions. It may help to take your pain medicine 30 to 60 minutes before these activities. Reduce your stress by getting enough sleep, working on hobbies you enjoy and practicing relaxation or meditation. Talk to your care team about ways to manage your pain beyond prescription opioids.    These medicines have risks:    DO NOT drive when on new or higher doses of pain medicine. These medicines can affect your alertness and reaction times, and you could be arrested for driving under the influence (DUI). If you need to use opioids long-term, talk to your care team about driving.    DO NOT operate heavy machinery    DO NOT do any other dangerous activities while taking these medicines.    DO NOT drink  any alcohol while taking these medicines.     If the opioid prescribed includes acetaminophen, DO NOT take with any other medicines that contain acetaminophen. Read all labels carefully. Look for the word  acetaminophen  or  Tylenol.  Ask your pharmacist if you have questions or are unsure.    You can get addicted to pain medicines, especially if you have a history of addiction (chemical, alcohol or substance dependence). Talk to your care team about ways to reduce this risk.    All opioids tend to cause constipation. Drink plenty of water and eat foods that have a lot of fiber, such as fruits, vegetables, prune juice, apple juice and high-fiber cereal. Take a laxative (Miralax, milk of magnesia, Colace, Senna) if you don t move your bowels at least every other day. Other side effects include upset stomach, sleepiness, dizziness, throwing up, tolerance (needing more of the medicine to have the same effect), physical dependence and slowed breathing.    Store your pills in a secure place, locked if possible. We will not replace any lost or stolen medicine. If you don t finish your medicine, please throw away (dispose) as directed by your pharmacist. The [place] Pollution Control Agency has more information about safe disposal: [email or web address]         Primary Care Provider Fax #    Physician No Ref-Primary [phone]       No address on file        Equal Access to Services     SIVAN OLIVAS : Marbin Colon, waaxda luqadaha, qaybta kaalmada adebrendon, duc denson. So [facility] [phone].    ATENCIÓN: Si habla español, tiene a stacy disposición servicios gratuitos de asistencia lingüística. Llame al [phone].    We comply with applicable federal civil rights laws and [place] laws. We do not discriminate on the basis of race, color, national origin, age, disability, sex, sexual orientation, or gender identity.             Thank you!     Thank you for choosing [facility]  for your care. Our goal is always to provide you with excellent care. Hearing back from our patients is one way we can continue to improve our services. Please take a few minutes to complete the written survey that you may receive in the mail after your visit with us. Thank you!             Your Updated Medication List - Protect others around you: Learn how to safely use, store and throw away your medicines at www.disposemymeds.org.          This list is accurate as of 8/30/18  4:15 PM.  Always use your most recent med list.                   Brand Name Dispense Instructions for use Diagnosis    * atropine 1 % ophthalmic solution     1 Bottle    Place 1-2 drops into the right eye 2 times daily    Postoperative eye state       * atropine 1 % ophthalmic solution     1 Bottle    Place 1-2 drops into the right eye daily    Ruptured globe of left eye, subsequent encounter       erythromycin ophthalmic ointment    ROMYCIN    1 Tube    Place 0.5 inches into the right eye 3 times daily    Postoperative eye state       * moxifloxacin 0.5 % ophthalmic solution    VIGAMOX    1 Bottle    Place 1 drop into the right eye 4 times daily    Postoperative eye state       * moxifloxacin 0.5 % ophthalmic solution    VIGAMOX    1 Bottle    Place 1 drop into the right eye 4 times daily    Ruptured globe of left eye, subsequent encounter       Multi-vitamin Tabs tablet      Take 1 tablet by mouth daily        * oxyCODONE-acetaminophen 5-325 MG per tablet    PERCOCET    20 tablet    Take 1 tablet by mouth every 6 hours as needed for pain    Right eye injury, sequela       * oxyCODONE-acetaminophen 5-325 MG per tablet    PERCOCET    28 tablet    Take 1 tablet by mouth every 6 hours as needed for pain    Ruptured globe of left eye, subsequent encounter       * prednisoLONE acetate 1 % ophthalmic susp    PRED FORTE    1 Bottle    Place 1 drop into the right eye every 2 hours (while awake)     Postoperative eye state       * prednisoLONE acetate 1 % ophthalmic susp    PRED FORTE    1 Bottle    Place 1-2 drops into the right eye 4 times daily    Ruptured globe of left eye, subsequent encounter       * Notice:  This list has 8 medication(s) that are the same as other medications prescribed for you. Read the directions carefully, and ask your doctor or other care provider to review them with you.

## 2018-09-04 ENCOUNTER — SURGERY (OUTPATIENT)
Age: 28
End: 2018-09-04

## 2018-09-04 ENCOUNTER — HOSPITAL ENCOUNTER (OUTPATIENT)
Facility: AMBULATORY SURGERY CENTER | Age: 28
End: 2018-09-04
Attending: OPHTHALMOLOGY

## 2018-09-04 VITALS
HEART RATE: 65 BPM | SYSTOLIC BLOOD PRESSURE: 128 MMHG | BODY MASS INDEX: 26.31 KG/M2 | DIASTOLIC BLOOD PRESSURE: 78 MMHG | WEIGHT: 205 LBS | TEMPERATURE: 98.4 F | OXYGEN SATURATION: 100 % | HEIGHT: 74 IN | RESPIRATION RATE: 14 BRPM

## 2018-09-04 DIAGNOSIS — S05.91XS RIGHT EYE INJURY, SEQUELA: ICD-10-CM

## 2018-09-04 DIAGNOSIS — H33.051 RECENT RETINAL DETACHMENT OF RIGHT EYE, TOTAL/SUBTOTAL: ICD-10-CM

## 2018-09-04 DIAGNOSIS — S05.91XD RIGHT EYE INJURY, SUBSEQUENT ENCOUNTER: Primary | ICD-10-CM

## 2018-09-04 DEVICE — IMPLANTABLE DEVICE
Type: IMPLANTABLE DEVICE | Site: EYE | Status: NON-FUNCTIONAL
Removed: 2019-03-11

## 2018-09-04 RX ORDER — PROPOFOL 10 MG/ML
INJECTION, EMULSION INTRAVENOUS PRN
Status: DISCONTINUED | OUTPATIENT
Start: 2018-09-04 | End: 2018-09-04

## 2018-09-04 RX ORDER — HYDROMORPHONE HYDROCHLORIDE 1 MG/ML
.3-.5 INJECTION, SOLUTION INTRAMUSCULAR; INTRAVENOUS; SUBCUTANEOUS EVERY 10 MIN PRN
Status: DISCONTINUED | OUTPATIENT
Start: 2018-09-04 | End: 2018-09-05 | Stop reason: HOSPADM

## 2018-09-04 RX ORDER — FENTANYL CITRATE 50 UG/ML
25-50 INJECTION, SOLUTION INTRAMUSCULAR; INTRAVENOUS
Status: DISCONTINUED | OUTPATIENT
Start: 2018-09-04 | End: 2018-09-04 | Stop reason: HOSPADM

## 2018-09-04 RX ORDER — LIDOCAINE 40 MG/G
CREAM TOPICAL
Status: DISCONTINUED | OUTPATIENT
Start: 2018-09-04 | End: 2018-09-04 | Stop reason: HOSPADM

## 2018-09-04 RX ORDER — PROPOFOL 10 MG/ML
INJECTION, EMULSION INTRAVENOUS CONTINUOUS PRN
Status: DISCONTINUED | OUTPATIENT
Start: 2018-09-04 | End: 2018-09-04

## 2018-09-04 RX ORDER — ACETAMINOPHEN 325 MG/1
975 TABLET ORAL ONCE
Status: COMPLETED | OUTPATIENT
Start: 2018-09-04 | End: 2018-09-04

## 2018-09-04 RX ORDER — ONDANSETRON 2 MG/ML
INJECTION INTRAMUSCULAR; INTRAVENOUS PRN
Status: DISCONTINUED | OUTPATIENT
Start: 2018-09-04 | End: 2018-09-04

## 2018-09-04 RX ORDER — MEPERIDINE HYDROCHLORIDE 25 MG/ML
12.5 INJECTION INTRAMUSCULAR; INTRAVENOUS; SUBCUTANEOUS
Status: DISCONTINUED | OUTPATIENT
Start: 2018-09-04 | End: 2018-09-05 | Stop reason: HOSPADM

## 2018-09-04 RX ORDER — BALANCED SALT SOLUTION 6.4; .75; .48; .3; 3.9; 1.7 MG/ML; MG/ML; MG/ML; MG/ML; MG/ML; MG/ML
SOLUTION OPHTHALMIC PRN
Status: DISCONTINUED | OUTPATIENT
Start: 2018-09-04 | End: 2018-09-04 | Stop reason: HOSPADM

## 2018-09-04 RX ORDER — ATROPINE SULFATE 10 MG/ML
SOLUTION/ DROPS OPHTHALMIC PRN
Status: DISCONTINUED | OUTPATIENT
Start: 2018-09-04 | End: 2018-09-04 | Stop reason: HOSPADM

## 2018-09-04 RX ORDER — CYCLOPENTOLATE HYDROCHLORIDE 10 MG/ML
1 SOLUTION/ DROPS OPHTHALMIC
Status: COMPLETED | OUTPATIENT
Start: 2018-09-04 | End: 2018-09-04

## 2018-09-04 RX ORDER — DEXAMETHASONE SODIUM PHOSPHATE 4 MG/ML
INJECTION, SOLUTION INTRA-ARTICULAR; INTRALESIONAL; INTRAMUSCULAR; INTRAVENOUS; SOFT TISSUE PRN
Status: DISCONTINUED | OUTPATIENT
Start: 2018-09-04 | End: 2018-09-04 | Stop reason: HOSPADM

## 2018-09-04 RX ORDER — LIDOCAINE HYDROCHLORIDE 20 MG/ML
INJECTION, SOLUTION INFILTRATION; PERINEURAL PRN
Status: DISCONTINUED | OUTPATIENT
Start: 2018-09-04 | End: 2018-09-04

## 2018-09-04 RX ORDER — OXYCODONE HYDROCHLORIDE 5 MG/1
5 TABLET ORAL ONCE
Status: COMPLETED | OUTPATIENT
Start: 2018-09-04 | End: 2018-09-04

## 2018-09-04 RX ORDER — PHENYLEPHRINE HYDROCHLORIDE 25 MG/ML
1 SOLUTION/ DROPS OPHTHALMIC
Status: DISCONTINUED | OUTPATIENT
Start: 2018-09-04 | End: 2018-09-04 | Stop reason: HOSPADM

## 2018-09-04 RX ORDER — FENTANYL CITRATE 50 UG/ML
INJECTION, SOLUTION INTRAMUSCULAR; INTRAVENOUS PRN
Status: DISCONTINUED | OUTPATIENT
Start: 2018-09-04 | End: 2018-09-04

## 2018-09-04 RX ORDER — ONDANSETRON 4 MG/1
4 TABLET, ORALLY DISINTEGRATING ORAL EVERY 30 MIN PRN
Status: DISCONTINUED | OUTPATIENT
Start: 2018-09-04 | End: 2018-09-05 | Stop reason: HOSPADM

## 2018-09-04 RX ORDER — FENTANYL CITRATE 50 UG/ML
25-50 INJECTION, SOLUTION INTRAMUSCULAR; INTRAVENOUS
Status: DISCONTINUED | OUTPATIENT
Start: 2018-09-04 | End: 2018-09-05 | Stop reason: HOSPADM

## 2018-09-04 RX ORDER — GLYCOPYRROLATE 0.2 MG/ML
INJECTION, SOLUTION INTRAMUSCULAR; INTRAVENOUS PRN
Status: DISCONTINUED | OUTPATIENT
Start: 2018-09-04 | End: 2018-09-04

## 2018-09-04 RX ORDER — TROPICAMIDE 10 MG/ML
1 SOLUTION/ DROPS OPHTHALMIC
Status: COMPLETED | OUTPATIENT
Start: 2018-09-04 | End: 2018-09-04

## 2018-09-04 RX ORDER — ONDANSETRON 2 MG/ML
4 INJECTION INTRAMUSCULAR; INTRAVENOUS EVERY 30 MIN PRN
Status: DISCONTINUED | OUTPATIENT
Start: 2018-09-04 | End: 2018-09-05 | Stop reason: HOSPADM

## 2018-09-04 RX ORDER — SODIUM CHLORIDE, SODIUM LACTATE, POTASSIUM CHLORIDE, CALCIUM CHLORIDE 600; 310; 30; 20 MG/100ML; MG/100ML; MG/100ML; MG/100ML
INJECTION, SOLUTION INTRAVENOUS CONTINUOUS
Status: DISCONTINUED | OUTPATIENT
Start: 2018-09-04 | End: 2018-09-05 | Stop reason: HOSPADM

## 2018-09-04 RX ORDER — KETOROLAC TROMETHAMINE 30 MG/ML
INJECTION, SOLUTION INTRAMUSCULAR; INTRAVENOUS PRN
Status: DISCONTINUED | OUTPATIENT
Start: 2018-09-04 | End: 2018-09-04

## 2018-09-04 RX ORDER — OXYCODONE HCL 5 MG/5 ML
5 SOLUTION, ORAL ORAL EVERY 4 HOURS PRN
Status: DISCONTINUED | OUTPATIENT
Start: 2018-09-04 | End: 2018-09-05 | Stop reason: HOSPADM

## 2018-09-04 RX ORDER — GABAPENTIN 300 MG/1
300 CAPSULE ORAL ONCE
Status: COMPLETED | OUTPATIENT
Start: 2018-09-04 | End: 2018-09-04

## 2018-09-04 RX ORDER — CYCLOPENTOLATE HYDROCHLORIDE 10 MG/ML
1 SOLUTION/ DROPS OPHTHALMIC
Status: DISCONTINUED | OUTPATIENT
Start: 2018-09-04 | End: 2018-09-04 | Stop reason: HOSPADM

## 2018-09-04 RX ORDER — NALOXONE HYDROCHLORIDE 0.4 MG/ML
.1-.4 INJECTION, SOLUTION INTRAMUSCULAR; INTRAVENOUS; SUBCUTANEOUS
Status: DISCONTINUED | OUTPATIENT
Start: 2018-09-04 | End: 2018-09-05 | Stop reason: HOSPADM

## 2018-09-04 RX ORDER — TROPICAMIDE 10 MG/ML
1 SOLUTION/ DROPS OPHTHALMIC
Status: DISCONTINUED | OUTPATIENT
Start: 2018-09-04 | End: 2018-09-04 | Stop reason: HOSPADM

## 2018-09-04 RX ORDER — SODIUM CHLORIDE, SODIUM LACTATE, POTASSIUM CHLORIDE, CALCIUM CHLORIDE 600; 310; 30; 20 MG/100ML; MG/100ML; MG/100ML; MG/100ML
INJECTION, SOLUTION INTRAVENOUS CONTINUOUS
Status: DISCONTINUED | OUTPATIENT
Start: 2018-09-04 | End: 2018-09-04 | Stop reason: HOSPADM

## 2018-09-04 RX ORDER — PHENYLEPHRINE HYDROCHLORIDE 25 MG/ML
1 SOLUTION/ DROPS OPHTHALMIC
Status: COMPLETED | OUTPATIENT
Start: 2018-09-04 | End: 2018-09-04

## 2018-09-04 RX ORDER — DEXAMETHASONE SODIUM PHOSPHATE 4 MG/ML
INJECTION, SOLUTION INTRA-ARTICULAR; INTRALESIONAL; INTRAMUSCULAR; INTRAVENOUS; SOFT TISSUE PRN
Status: DISCONTINUED | OUTPATIENT
Start: 2018-09-04 | End: 2018-09-04

## 2018-09-04 RX ORDER — NEOMYCIN POLYMYXIN B SULFATES AND DEXAMETHASONE 3.5; 10000; 1 MG/ML; [USP'U]/ML; MG/ML
1 SUSPENSION/ DROPS OPHTHALMIC 4 TIMES DAILY
Qty: 5 ML | Refills: 0 | Status: SHIPPED | OUTPATIENT
Start: 2018-09-04 | End: 2018-11-21

## 2018-09-04 RX ADMIN — PROPOFOL 50 MG: 10 INJECTION, EMULSION INTRAVENOUS at 14:26

## 2018-09-04 RX ADMIN — FENTANYL CITRATE 50 MCG: 50 INJECTION, SOLUTION INTRAMUSCULAR; INTRAVENOUS at 14:12

## 2018-09-04 RX ADMIN — ATROPINE SULFATE 1 DROP: 10 SOLUTION/ DROPS OPHTHALMIC at 14:37

## 2018-09-04 RX ADMIN — ONDANSETRON 4 MG: 2 INJECTION INTRAMUSCULAR; INTRAVENOUS at 14:12

## 2018-09-04 RX ADMIN — Medication 500 ML: at 14:40

## 2018-09-04 RX ADMIN — FENTANYL CITRATE 50 MCG: 50 INJECTION, SOLUTION INTRAMUSCULAR; INTRAVENOUS at 14:17

## 2018-09-04 RX ADMIN — PHENYLEPHRINE HYDROCHLORIDE 1 DROP: 25 SOLUTION/ DROPS OPHTHALMIC at 11:50

## 2018-09-04 RX ADMIN — BALANCED SALT SOLUTION 15 ML: 6.4; .75; .48; .3; 3.9; 1.7 SOLUTION OPHTHALMIC at 14:37

## 2018-09-04 RX ADMIN — CYCLOPENTOLATE HYDROCHLORIDE 1 DROP: 10 SOLUTION/ DROPS OPHTHALMIC at 11:39

## 2018-09-04 RX ADMIN — CYCLOPENTOLATE HYDROCHLORIDE 1 DROP: 10 SOLUTION/ DROPS OPHTHALMIC at 11:44

## 2018-09-04 RX ADMIN — KETOROLAC TROMETHAMINE 30 MG: 30 INJECTION, SOLUTION INTRAMUSCULAR; INTRAVENOUS at 15:11

## 2018-09-04 RX ADMIN — PHENYLEPHRINE HYDROCHLORIDE 1 DROP: 25 SOLUTION/ DROPS OPHTHALMIC at 11:45

## 2018-09-04 RX ADMIN — Medication 0.5 MG: at 15:11

## 2018-09-04 RX ADMIN — SODIUM CHLORIDE, SODIUM LACTATE, POTASSIUM CHLORIDE, CALCIUM CHLORIDE: 600; 310; 30; 20 INJECTION, SOLUTION INTRAVENOUS at 14:07

## 2018-09-04 RX ADMIN — TROPICAMIDE 1 DROP: 10 SOLUTION/ DROPS OPHTHALMIC at 11:51

## 2018-09-04 RX ADMIN — Medication 0.2 ML: at 14:39

## 2018-09-04 RX ADMIN — GABAPENTIN 300 MG: 300 CAPSULE ORAL at 11:06

## 2018-09-04 RX ADMIN — LIDOCAINE HYDROCHLORIDE 100 MG: 20 INJECTION, SOLUTION INFILTRATION; PERINEURAL at 14:12

## 2018-09-04 RX ADMIN — Medication 0.5 ML: at 14:38

## 2018-09-04 RX ADMIN — GLYCOPYRROLATE 0.2 MG: 0.2 INJECTION, SOLUTION INTRAMUSCULAR; INTRAVENOUS at 14:12

## 2018-09-04 RX ADMIN — PROPOFOL 200 MCG/KG/MIN: 10 INJECTION, EMULSION INTRAVENOUS at 14:12

## 2018-09-04 RX ADMIN — TROPICAMIDE 1 DROP: 10 SOLUTION/ DROPS OPHTHALMIC at 11:46

## 2018-09-04 RX ADMIN — CYCLOPENTOLATE HYDROCHLORIDE 1 DROP: 10 SOLUTION/ DROPS OPHTHALMIC at 11:49

## 2018-09-04 RX ADMIN — DEXAMETHASONE SODIUM PHOSPHATE 0.5 ML: 4 INJECTION, SOLUTION INTRA-ARTICULAR; INTRALESIONAL; INTRAMUSCULAR; INTRAVENOUS; SOFT TISSUE at 14:38

## 2018-09-04 RX ADMIN — PHENYLEPHRINE HYDROCHLORIDE 1 DROP: 25 SOLUTION/ DROPS OPHTHALMIC at 11:40

## 2018-09-04 RX ADMIN — ACETAMINOPHEN 975 MG: 325 TABLET ORAL at 11:06

## 2018-09-04 RX ADMIN — PROPOFOL 200 MG: 10 INJECTION, EMULSION INTRAVENOUS at 14:14

## 2018-09-04 RX ADMIN — OXYCODONE HYDROCHLORIDE 5 MG: 5 TABLET ORAL at 16:39

## 2018-09-04 RX ADMIN — TROPICAMIDE 1 DROP: 10 SOLUTION/ DROPS OPHTHALMIC at 11:41

## 2018-09-04 RX ADMIN — Medication 0.5 MG: at 15:21

## 2018-09-04 RX ADMIN — DEXAMETHASONE SODIUM PHOSPHATE 4 MG: 4 INJECTION, SOLUTION INTRA-ARTICULAR; INTRALESIONAL; INTRAMUSCULAR; INTRAVENOUS; SOFT TISSUE at 14:12

## 2018-09-04 ASSESSMENT — LIFESTYLE VARIABLES: TOBACCO_USE: 1

## 2018-09-04 NOTE — ANESTHESIA POSTPROCEDURE EVALUATION
Patient: Willam Mills    Procedure(s):  Right Eye 23 [address] Vitrectomy, Fluid/Gas Exchange, Endolaser, Silicone Oil, Retinectomy - Wound Class: I-Clean    Diagnosis:Ruptured Globe  Diagnosis Additional Information: No value filed.    Anesthesia Type:  General, LMA    Note:  Anesthesia Post Evaluation    Patient location during evaluation: PACU  Patient participation: Able to fully participate in evaluation  Level of consciousness: awake and alert  Pain management: adequate  Airway patency: patent  Cardiovascular status: acceptable  Respiratory status: acceptable  Hydration status: acceptable  PONV: none     Anesthetic complications: None          Last vitals:  Vitals:    09/04/18 1641 09/04/18 1655 09/04/18 1710   BP: 126/67 125/71 128/78   Pulse:      Resp: 14 14 14   Temp:  36.9  C (98.4  F)    SpO2: 100%  100%         Electronically Signed By: Benji Vaughn MD  September 4, 2018  5:32 PM

## 2018-09-04 NOTE — DISCHARGE INSTRUCTIONS
[facility]  [phone]  Post Operative Eye Surgery Instructions    MD Norma Bustos MD  Will I have pain?  Some discomfort is normal and expected following surgery. The first few days after surgery you may need to use prescription pain pills. Taking Tylenol (acetaminophen) regularly may help prevent pain.  Discomfort should gradually decrease and Tylenol should be sufficient to relieve pain. A foreign body sensation in the cornea of the eye is very common and caused by sutures placed at surgery. These sutures will go away in one to two weeks. If the pain worsens, you should call the doctor.  Do I need to wear an eye patch?  You do not need to wear an eye patch at home after the doctor has removed the patch on your first day after surgery. However, you may be more comfortable wearing a patch outside in the sun, when sleeping or napping, or in a pedro, windy environment.  How much drainage should I have?  You may expect a moderate amount of drainage for a week. Gradually the drainage should decrease. The lids can be cleaned with a clean washcloth and gentle soap or diluted baby shampoo. Wipe the eyelids gently from the nose outward. Some blood in the tears is a normal finding.  Will there be swelling?  Some swelling is normal for about a week or two after which it will gradually decrease. Applying a cool compress, using a clean washcloth for 5 - 10 minutes several times a day may reduce the swelling and make you more comfortable. People may have some swelling of both eyes, especially if face down positioning is required. The white part of the eye may appear very red or bloody for a week or two. This may get worse a few days after surgery. Though the bright red appearance can look frightening, it is a normal finding early after surgery and will resolve in a few weeks.  Will I need to use eye drops?  You will be using several different kinds of eye drops or ointment (salve) when you  leave the hospital. The directions will be on each bottle or tube. The medication with the red top will keep your eye dilated and may make your eye more sensitive to light. Wearing sunglasses may help. The other medication is a combination antibiotic/steroid to prevent infection and promote healing.  Occasionally a third drop is used to control the pressure in your eye. A new bottle of artificial tears or lubricant ointment may be used along with your prescription eye drops after surgery. You will be using drops from four to eight weeks. Bring all eye medications (drops. ointments, or pills) with you  to each visit.   Always wash your hands before putting in the eye drops. You may wish to have someone else help you. Pull down on the lower lid and squeeze one drop from the bottle being careful not to touch the dropper to your eye or eyelid. One drop is sufficient, but another may be used if the first did not go into the eye. It is often easier to put in the drops if you are reclining or lying down. Wait five (5) minutes after the first drop before using the second drop to allow the medications to absorb into the eye.  How long will it take for my vision to improve?  Your vision should gradually improve, but it may take up to six months to regain your best vision. Frequently, air or gas bubbles are injected into the eye at the time of surgery. This will blur your vision significantly at first. As the bubble becomes smaller it will cause a black line in your vision that moves as you move your head. As the bubble becomes smaller you may notice that it looks more like a bubble or that it will break up into several smaller bubbles. It will take from a few days to a few weeks for the bubble to dissolve and be replaced by clear fluid.  You may notice floaters or double vision after your surgery. These symptoms usually will decrease with time. If the double vision is bothersome patching the eye may help.  If you notice a  sudden worsening in your vision call your doctor.  Are there any physical restrictions after surgery?  If an air or gas bubble was placed in the eye during surgery, you will be asked to spend most of your time (both awake and during the night) with your head in a specific position, frequently face down. As the eye heals and the bubble dissolves there will be less of a need for you to stay in that specific position. You should avoid sleeping on your back until the bubble has totally dissolved and you have been given permission from your surgeon. You should not fly in an airplane or go to high altitudes in the mountains while there is a bubble in your eye. If you should require any other surgery, under general anesthesia, while you still have an air bubble in your eye, have your surgeon or anesthetist contact us prior to your surgery. Some anesthetic agents can make the bubble expand and seriously damage your eye.  Patients that have a gas/air bubble placed in their eye will have a green medical alert band on their wrist.  This band should not be removed until the doctor removes it for you or gives you permission to remove it.     Heavy lifting (greater than 50 pounds), swimming and contact sports should be avoided for about 3 to 4 weeks after surgery.  You may resume your usual sexual activities about one week after surgery.  When may I return to work or my normal activities?  Depending on the type or work, you may return to work within a few days. If your work involves physical activity or driving, you will need to restrict your activities and remain home longer.  You may watch television, look at magazines, or work puzzles. Reading may be uncomfortable for several days, but using the eyes will not cause any damage.  You may go outside as usual. If conditions are windy or pedro, wear an eye pad to avoid getting dust or dirt in the eye.  Can I travel?  You cannot fly in an airplane or drive into the mountains as long  as the air or gas bubble remains in your eye.    Are there any driving restrictions?  Someone will need to drive you home from the hospital. Generally driving can be resumed in several days if you have good vision in your other eye. If you do not feel comfortable driving, do not drive! Your depth perception will be decreased so you will want to try driving during the day in light traffic until you feel comfortable driving. You should restrict your driving while you are taking prescription pain pills as they also can affect your judgment.  When can I shower and wash my hair?  You may shower or bathe when you get home, but avoid getting water in your eye. You may want someone to help you shampoo your hair at first.  You may shave, brush your teeth, or comb your hair. Do not use make-up, mascara, or creams/lotions around your eye for several weeks.  When will I see the doctor again?  Generally, you will be seen the first day after surgery and again 1-2 weeks later. If you have not received a return appointment before leaving the hospital, you should call our office during the business hours to arrange an appointment. If you will be seeing your local doctor instead of us, you will need to call that office to set up an appointment.  How do I reach a doctor if I have concerns?  One of our doctors is available by calling [facility] [phone]. Please try to call for routine questions and prescription refills during business hours.  You should call your doctor if:  You notice a sudden decrease in your vision.  Have severe pain or pain increases rather than subsiding.  You notice a new black curtain over your eye that is not the gas bubble. If you have any of these symptoms, you may need to be examined.      [facility]  Home Care Following Anesthesia  For 24 hours after surgery:  1. Get plenty of rest.  A responsible adult must stay with you for at least 24  "hours after you leave the surgery center.  2. Do not drive or use heavy equipment.  If you have weakness or tingling, don't drive or use heavy equipment until this feeling goes away.   3. Do not drink alcohol.   4. Avoid strenuous or risky activities.  Ask for help when climbing stairs.  5. You may feel lightheaded.  IF so, sit for a few minutes before standing.  Have someone help you get up.   6. If you have nausea (feel sick to your stomach): Drink only clear liquids such as apple juice, ginger ale, broth or 7-Up.  Rest may also help.  Be sure to drink enough fluids.  Move to a regular diet as you feel able.   7. You may have a slight fever.  Call the doctor if your fever is over 100 F (37.7 C) (taken under the tongue) or lasts longer than 24 hours.  8. You may have a dry mouth, a sore throat, muscle aches or trouble sleeping. These should go away after 24 hours.  9. Do not make important or legal decisions.        Today you received a Marcaine or bupivacaine block to numb the nerves near your surgery site.  This is a block using local anesthetic or \"numbing\" medication injected around the nerves to anesthetize or \"numb\" the area supplied by those nerves.  This block is injected into the muscle layer near your surgical site.  The medication may numb the location where you had surgery for 6-18 hours, but may last up to 24 hours.  If your surgical site is an arm or leg you should be careful with your affected limb, since it is possible to injure your limb without being aware of it due to the numbing.  Until full feeling returns, you should guard against bumping or hitting your limb, and avoid extreme hot or cold temperatures on the skin.  As the block wears off, the feeling will return as a tingling or prickly sensation near your surgical site.  You will experience more discomfort from your incision as the feeling returns.  You may want to take a pain pill (a narcotic or Tylenol if this was prescribed by your " surgeon) when you start to experience mild pain before the pain beccomes more severe.  If your pain medications do not control your pain you should notifiy your surgeon.    Tips for taking pain medications  To get the best pain relief possible, remember these points:    Take pain medications as directed, before pain becomes severe.    Pain medication can upset your stomach: taking it with food may help.    Constipation is a common side effect of pain medication. Drink plenty of  fluids.    Eat foods high in fiber. Take a stool softener if recommended by your doctor or pharmacist.    Do not drink alcohol, drive or operate machinery while taking pain medications.    Ask about other ways to control pain, such as with heat, ice or relaxation.    Tylenol/Acetaminophen Consumption  To help encourage the safe use of acetaminophen, the makers of TYLENOL  have lowered the maximum daily dose for single-ingredient Extra Strength TYLENOL  (acetaminophen) products sold in the [place] from 8 pills per day (4,000 mg) to 6 pills per day (3,000 mg). The dosing interval has also changed from 2 pills every 4-6 hours to 2 pills every 6 hours.    If you feel your pain relief is insufficient, you may take Tylenol/Acetaminophen in addition to your narcotic pain medication.     Be careful not to exceed 3,000 mg of Tylenol/Acetaminophen in a 24 hour period from all sources.    If you are taking extra strength Tylenol/acetaminophen (500 mg), the maximum dose is 6 tablets in 24 hours.    If you are taking regular strength acetaminophen (325 mg), the maximum dose is 9 tablets in 24 hours.    Call a doctor for any of the followin. Signs of infection (fever, growing tenderness at the surgery site, a large amount of drainage or bleeding, severe pain, foul-smelling drainage, redness, swelling).  2. It has been over 8 to 10 hours since surgery and you are still not able to urinate (pass water).  3. Headache for over 24 hours.  4. Numbness,  tingling or weakness the day after surgery (if you had spinal anesthesia).  Your doctor is:       Dr. Landon Sandoval, Ophthalmology: [phone]               Or dial [phone] and ask for the resident on call for:  Ophthalmology  For emergency care, call the:  [facility]:  [phone] (TTY for hearing impaired: [phone])

## 2018-09-04 NOTE — IP AVS SNAPSHOT
MRN:[id number]                      After Visit Summary   9/4/2018    Willam Mills    MRN: [id number]           Thank you!     Thank you for choosing [facility] for your care. Our goal is always to provide you with excellent care. Hearing back from our patients is one way we can continue to improve our services. Please take a few minutes to complete the written survey that you may receive in the mail after you visit with us. Thank you!        Patient Information     Date Of Birth          1990        About your hospital stay     You were admitted on:  September 4, 2018 [facility]    You were discharged on:  September 4, 2018       Who to Call     For medical emergencies, please call 911.  For non-urgent questions about your medical care, please call your primary care provider or clinic, None  For questions related to your surgery, please call your surgery clinic        Attending Provider     Provider Norma Reddy MD Ophthalmology       Primary Care Provider Fax #    Physician No Ref-Primary [phone]      After Care Instructions     Activity       Avoid strenuous activities the next several days.                  Your next 10 appointments already scheduled     Sep 06, 2018  7:30 AM CDT   Post-Op with Norma Aleman MD   Eye Clinic ([facility])    [address]  [facility] [address]   [phone]              Further instructions from your care team       [facility]  [phone]  Post Operative Eye Surgery Instructions    MD Norma Bustos MD  Will I have pain?  Some discomfort is normal and expected following surgery. The first few days after surgery you may need to use prescription pain pills. Taking Tylenol (acetaminophen) regularly may help prevent pain.  Discomfort should gradually decrease and  Tylenol should be sufficient to relieve pain. A foreign body sensation in the cornea of the eye is very common and caused by sutures placed at surgery. These sutures will go away in one to two weeks. If the pain worsens, you should call the doctor.  Do I need to wear an eye patch?  You do not need to wear an eye patch at home after the doctor has removed the patch on your first day after surgery. However, you may be more comfortable wearing a patch outside in the sun, when sleeping or napping, or in a pedro, windy environment.  How much drainage should I have?  You may expect a moderate amount of drainage for a week. Gradually the drainage should decrease. The lids can be cleaned with a clean washcloth and gentle soap or diluted baby shampoo. Wipe the eyelids gently from the nose outward. Some blood in the tears is a normal finding.  Will there be swelling?  Some swelling is normal for about a week or two after which it will gradually decrease. Applying a cool compress, using a clean washcloth for 5 - 10 minutes several times a day may reduce the swelling and make you more comfortable. People may have some swelling of both eyes, especially if face down positioning is required. The white part of the eye may appear very red or bloody for a week or two. This may get worse a few days after surgery. Though the bright red appearance can look frightening, it is a normal finding early after surgery and will resolve in a few weeks.  Will I need to use eye drops?  You will be using several different kinds of eye drops or ointment (salve) when you leave the hospital. The directions will be on each bottle or tube. The medication with the red top will keep your eye dilated and may make your eye more sensitive to light. Wearing sunglasses may help. The other medication is a combination antibiotic/steroid to prevent infection and promote healing.  Occasionally a third drop is used to control the pressure in your eye. A new bottle  of artificial tears or lubricant ointment may be used along with your prescription eye drops after surgery. You will be using drops from four to eight weeks. Bring all eye medications (drops. ointments, or pills) with you  to each visit.   Always wash your hands before putting in the eye drops. You may wish to have someone else help you. Pull down on the lower lid and squeeze one drop from the bottle being careful not to touch the dropper to your eye or eyelid. One drop is sufficient, but another may be used if the first did not go into the eye. It is often easier to put in the drops if you are reclining or lying down. Wait five (5) minutes after the first drop before using the second drop to allow the medications to absorb into the eye.  How long will it take for my vision to improve?  Your vision should gradually improve, but it may take up to six months to regain your best vision. Frequently, air or gas bubbles are injected into the eye at the time of surgery. This will blur your vision significantly at first. As the bubble becomes smaller it will cause a black line in your vision that moves as you move your head. As the bubble becomes smaller you may notice that it looks more like a bubble or that it will break up into several smaller bubbles. It will take from a few days to a few weeks for the bubble to dissolve and be replaced by clear fluid.  You may notice floaters or double vision after your surgery. These symptoms usually will decrease with time. If the double vision is bothersome patching the eye may help.  If you notice a sudden worsening in your vision call your doctor.  Are there any physical restrictions after surgery?  If an air or gas bubble was placed in the eye during surgery, you will be asked to spend most of your time (both awake and during the night) with your head in a specific position, frequently face down. As the eye heals and the bubble dissolves there will be less of a need for you to  stay in that specific position. You should avoid sleeping on your back until the bubble has totally dissolved and you have been given permission from your surgeon. You should not fly in an airplane or go to high altitudes in the mountains while there is a bubble in your eye. If you should require any other surgery, under general anesthesia, while you still have an air bubble in your eye, have your surgeon or anesthetist contact us prior to your surgery. Some anesthetic agents can make the bubble expand and seriously damage your eye.  Patients that have a gas/air bubble placed in their eye will have a green medical alert band on their wrist.  This band should not be removed until the doctor removes it for you or gives you permission to remove it.     Heavy lifting (greater than 50 pounds), swimming and contact sports should be avoided for about 3 to 4 weeks after surgery.  You may resume your usual sexual activities about one week after surgery.  When may I return to work or my normal activities?  Depending on the type or work, you may return to work within a few days. If your work involves physical activity or driving, you will need to restrict your activities and remain home longer.  You may watch television, look at magazines, or work puzzles. Reading may be uncomfortable for several days, but using the eyes will not cause any damage.  You may go outside as usual. If conditions are windy or pedro, wear an eye pad to avoid getting dust or dirt in the eye.  Can I travel?  You cannot fly in an airplane or drive into the mountains as long as the air or gas bubble remains in your eye.    Are there any driving restrictions?  Someone will need to drive you home from the hospital. Generally driving can be resumed in several days if you have good vision in your other eye. If you do not feel comfortable driving, do not drive! Your depth perception will be decreased so you will want to try driving during the day in light  traffic until you feel comfortable driving. You should restrict your driving while you are taking prescription pain pills as they also can affect your judgment.  When can I shower and wash my hair?  You may shower or bathe when you get home, but avoid getting water in your eye. You may want someone to help you shampoo your hair at first.  You may shave, brush your teeth, or comb your hair. Do not use make-up, mascara, or creams/lotions around your eye for several weeks.  When will I see the doctor again?  Generally, you will be seen the first day after surgery and again 1-2 weeks later. If you have not received a return appointment before leaving the hospital, you should call our office during the business hours to arrange an appointment. If you will be seeing your local doctor instead of us, you will need to call that office to set up an appointment.  How do I reach a doctor if I have concerns?  One of our doctors is available by calling [facility] [phone]. Please try to call for routine questions and prescription refills during business hours.  You should call your doctor if:  You notice a sudden decrease in your vision.  Have severe pain or pain increases rather than subsiding.  You notice a new black curtain over your eye that is not the gas bubble. If you have any of these symptoms, you may need to be examined.      [facility]  Home Care Following Anesthesia  For 24 hours after surgery:  1. Get plenty of rest.  A responsible adult must stay with you for at least 24 hours after you leave the surgery center.  2. Do not drive or use heavy equipment.  If you have weakness or tingling, don't drive or use heavy equipment until this feeling goes away.   3. Do not drink alcohol.   4. Avoid strenuous or risky activities.  Ask for help when climbing stairs.  5. You may feel lightheaded.  IF so, sit for a few minutes before standing.  Have someone help you  "get up.   6. If you have nausea (feel sick to your stomach): Drink only clear liquids such as apple juice, ginger ale, broth or 7-Up.  Rest may also help.  Be sure to drink enough fluids.  Move to a regular diet as you feel able.   7. You may have a slight fever.  Call the doctor if your fever is over 100 F (37.7 C) (taken under the tongue) or lasts longer than 24 hours.  8. You may have a dry mouth, a sore throat, muscle aches or trouble sleeping. These should go away after 24 hours.  9. Do not make important or legal decisions.        Today you received a Marcaine or bupivacaine block to numb the nerves near your surgery site.  This is a block using local anesthetic or \"numbing\" medication injected around the nerves to anesthetize or \"numb\" the area supplied by those nerves.  This block is injected into the muscle layer near your surgical site.  The medication may numb the location where you had surgery for 6-18 hours, but may last up to 24 hours.  If your surgical site is an arm or leg you should be careful with your affected limb, since it is possible to injure your limb without being aware of it due to the numbing.  Until full feeling returns, you should guard against bumping or hitting your limb, and avoid extreme hot or cold temperatures on the skin.  As the block wears off, the feeling will return as a tingling or prickly sensation near your surgical site.  You will experience more discomfort from your incision as the feeling returns.  You may want to take a pain pill (a narcotic or Tylenol if this was prescribed by your surgeon) when you start to experience mild pain before the pain beccomes more severe.  If your pain medications do not control your pain you should notifiy your surgeon.    Tips for taking pain medications  To get the best pain relief possible, remember these points:    Take pain medications as directed, before pain becomes severe.    Pain medication can upset your stomach: taking it with " food may help.    Constipation is a common side effect of pain medication. Drink plenty of  fluids.    Eat foods high in fiber. Take a stool softener if recommended by your doctor or pharmacist.    Do not drink alcohol, drive or operate machinery while taking pain medications.    Ask about other ways to control pain, such as with heat, ice or relaxation.    Tylenol/Acetaminophen Consumption  To help encourage the safe use of acetaminophen, the makers of TYLENOL  have lowered the maximum daily dose for single-ingredient Extra Strength TYLENOL  (acetaminophen) products sold in the [place] from 8 pills per day (4,000 mg) to 6 pills per day (3,000 mg). The dosing interval has also changed from 2 pills every 4-6 hours to 2 pills every 6 hours.    If you feel your pain relief is insufficient, you may take Tylenol/Acetaminophen in addition to your narcotic pain medication.     Be careful not to exceed 3,000 mg of Tylenol/Acetaminophen in a 24 hour period from all sources.    If you are taking extra strength Tylenol/acetaminophen (500 mg), the maximum dose is 6 tablets in 24 hours.    If you are taking regular strength acetaminophen (325 mg), the maximum dose is 9 tablets in 24 hours.    Call a doctor for any of the followin. Signs of infection (fever, growing tenderness at the surgery site, a large amount of drainage or bleeding, severe pain, foul-smelling drainage, redness, swelling).  2. It has been over 8 to 10 hours since surgery and you are still not able to urinate (pass water).  3. Headache for over 24 hours.  4. Numbness, tingling or weakness the day after surgery (if you had spinal anesthesia).  Your doctor is:       Dr. Landon Sandoval, Ophthalmology: [phone]               Or dial [phone] and ask for the resident on call for:  Ophthalmology  For emergency care, call the:  [facility]:  [phone] (TTY for hearing impaired: [phone])                  Pending Results     No  "orders found from 2018 to 2018.            Admission Information     Date & Time Provider Department Dept. Phone    2018 Norma Aleman MD [facility] [phone]      Your Vitals Were     Blood Pressure Pulse Temperature Respirations Height Weight    131/71 65 98.3  F (36.8  C) (Temporal) 14 1.867 m (6' 1.5\") 93 kg (205 lb)    Pulse Oximetry BMI (Body Mass Index)                100% 26.68 kg/m2          [employer] Information     isango! is an electronic gateway that provides easy, online access to your medical records. With isango!, you can request a clinic appointment, read your test results, renew a prescription or communicate with your care team.     To sign up for isango! visit the website at [email or web address]   You will be asked to enter the access code listed below, as well as some personal information. Please follow the directions to create your username and password.     Your access code is: [email or web address]  Expires: 2018  8:19 AM     Your access code will  in 90 days. If you need help or a new code, please contact your [facility] or call [phone] for assistance.        Care EveryWhere ID     This is your Care EveryWhere ID. This could be used by other organizations to access your [facility] medical records  [id number]        Equal Access to Services     SIVAN OLIVAS AH: Hadii aad ku hadasho Soshelleyali, waaxda luqadaha, qaybta kaalmada adeegyada, duc randolph . So [facility] [phone].    ATENCIÓN: Si habla español, tiene a stacy disposición servicios gratuitos de asistencia lingüística. Llame al [phone].    We comply with applicable federal civil rights laws and [place] laws. We do not discriminate on the basis of race, color, national origin, age, disability, sex, sexual orientation, or gender identity.               Review of your medicines      START taking        Dose / " Directions    neomycin-polymixin-dexamethasone ophthalmic suspension   Commonly known as:  MAXITROL   Used for:  Right eye injury, subsequent encounter        Dose:  1 drop   Apply 1 drop to eye 4 times daily Instill into operative eye(s) per physician instructions.   Quantity:  5 mL   Refills:  0         CONTINUE these medicines which have NOT CHANGED        Dose / Directions    * atropine 1 % ophthalmic solution   Used for:  Postoperative eye state        Dose:  1-2 drop   Place 1-2 drops into the right eye 2 times daily   Quantity:  1 Bottle   Refills:  0       * atropine 1 % ophthalmic solution   Used for:  Ruptured globe of left eye, subsequent encounter        Dose:  1-2 drop   Place 1-2 drops into the right eye daily   Quantity:  1 Bottle   Refills:  1       erythromycin ophthalmic ointment   Commonly known as:  ROMYCIN   Used for:  Postoperative eye state        Dose:  0.5 inch   Place 0.5 inches into the right eye 3 times daily   Quantity:  1 Tube   Refills:  1       IBUPROFEN PO        Refills:  0       * moxifloxacin 0.5 % ophthalmic solution   Commonly known as:  VIGAMOX   Used for:  Postoperative eye state        Dose:  1 drop   Place 1 drop into the right eye 4 times daily   Quantity:  1 Bottle   Refills:  0       * moxifloxacin 0.5 % ophthalmic solution   Commonly known as:  VIGAMOX   Used for:  Ruptured globe of left eye, subsequent encounter        Dose:  1 drop   Place 1 drop into the right eye 4 times daily   Quantity:  1 Bottle   Refills:  3       Multi-vitamin Tabs tablet        Dose:  1 tablet   Take 1 tablet by mouth daily   Refills:  0       * oxyCODONE-acetaminophen 5-325 MG per tablet   Commonly known as:  PERCOCET   Used for:  Right eye injury, sequela        Dose:  1 tablet   Take 1 tablet by mouth every 6 hours as needed for pain   Quantity:  20 tablet   Refills:  0       * oxyCODONE-acetaminophen 5-325 MG per tablet   Commonly known as:  PERCOCET   Used for:  Ruptured globe of left eye,  subsequent encounter        Dose:  1 tablet   Take 1 tablet by mouth every 6 hours as needed for pain   Quantity:  28 tablet   Refills:  0       * prednisoLONE acetate 1 % ophthalmic susp   Commonly known as:  PRED FORTE   Used for:  Postoperative eye state        Dose:  1 drop   Place 1 drop into the right eye every 2 hours (while awake)   Quantity:  1 Bottle   Refills:  0       * prednisoLONE acetate 1 % ophthalmic susp   Commonly known as:  PRED FORTE   Used for:  Ruptured globe of left eye, subsequent encounter        Dose:  1-2 drop   Place 1-2 drops into the right eye 4 times daily   Quantity:  1 Bottle   Refills:  3       * Notice:  This list has 8 medication(s) that are the same as other medications prescribed for you. Read the directions carefully, and ask your doctor or other care provider to review them with you.         Where to get your medicines      These medications were sent to [facility] [address] [address]    Hours:  TRANSPLANT PHONE NUMBER [phone] Phone:  [phone]     neomycin-polymixin-dexamethasone ophthalmic suspension                Protect others around you: Learn how to safely use, store and throw away your medicines at www.disposemymeds.org.             Medication List: This is a list of all your medications and when to take them. Check marks below indicate your daily home schedule. Keep this list as a reference.      Medications           Morning Afternoon Evening Bedtime As Needed    * atropine 1 % ophthalmic solution   Place 1-2 drops into the right eye 2 times daily   Last time this was given:  1 drop on 9/4/2018  2:37 PM                                * atropine 1 % ophthalmic solution   Place 1-2 drops into the right eye daily   Last time this was given:  1 drop on 9/4/2018  2:37 PM                                erythromycin ophthalmic ointment   Commonly known as:  ROMYCIN    Place 0.5 inches into the right eye 3 times daily                                IBUPROFEN PO                                * moxifloxacin 0.5 % ophthalmic solution   Commonly known as:  VIGAMOX   Place 1 drop into the right eye 4 times daily                                * moxifloxacin 0.5 % ophthalmic solution   Commonly known as:  VIGAMOX   Place 1 drop into the right eye 4 times daily                                Multi-vitamin Tabs tablet   Take 1 tablet by mouth daily                                neomycin-polymixin-dexamethasone ophthalmic suspension   Commonly known as:  MAXITROL   Apply 1 drop to eye 4 times daily Instill into operative eye(s) per physician instructions.                                * oxyCODONE-acetaminophen 5-325 MG per tablet   Commonly known as:  PERCOCET   Take 1 tablet by mouth every 6 hours as needed for pain                                * oxyCODONE-acetaminophen 5-325 MG per tablet   Commonly known as:  PERCOCET   Take 1 tablet by mouth every 6 hours as needed for pain                                * prednisoLONE acetate 1 % ophthalmic susp   Commonly known as:  PRED FORTE   Place 1 drop into the right eye every 2 hours (while awake)                                * prednisoLONE acetate 1 % ophthalmic susp   Commonly known as:  PRED FORTE   Place 1-2 drops into the right eye 4 times daily                                * Notice:  This list has 8 medication(s) that are the same as other medications prescribed for you. Read the directions carefully, and ask your doctor or other care provider to review them with you.

## 2018-09-04 NOTE — BRIEF OP NOTE
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

## 2018-09-04 NOTE — ANESTHESIA PREPROCEDURE EVALUATION
Anesthesia Evaluation     .             ROS/MED HX    ENT/Pulmonary:  - neg pulmonary ROS   (+)tobacco use, Current use , . .    Neurologic:  - neg neurologic ROS     Cardiovascular:  - neg cardiovascular ROS       METS/Exercise Tolerance:     Hematologic:  - neg hematologic  ROS       Musculoskeletal:  - neg musculoskeletal ROS       GI/Hepatic:  - neg GI/hepatic ROS       Renal/Genitourinary:  - ROS Renal section negative       Endo:  - neg endo ROS       Psychiatric:  - neg psychiatric ROS       Infectious Disease:  - neg infectious disease ROS       Malignancy:      - no malignancy   Other: Comment: Marijuana use   - neg other ROS                 Physical Exam  Normal systems: cardiovascular, pulmonary and dental    Airway   Mallampati: II  TM distance: >3 FB  Neck ROM: full    Dental     Cardiovascular       Pulmonary                     Anesthesia Plan      History & Physical Review  History and physical reviewed and following examination; no interval change.    ASA Status:  2 .    NPO Status:  > 8 hours    Plan for General and LMA with Intravenous induction. Maintenance will be Balanced.    PONV prophylaxis:  Ondansetron (or other 5HT-3) and Dexamethasone or Solumedrol       Postoperative Care  Postoperative pain management:  IV analgesics, Oral pain medications and Multi-modal analgesia.      Consents  Anesthetic plan, risks, benefits and alternatives discussed with:  Patient..                          .

## 2018-09-04 NOTE — OP NOTE
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 23 gauge pars plana vitectomy, membrane peel,  air-fluid exchange, endolaser, silicone oil placement right eye   ANESTHESIA: General anesthesia and peribulbar block right eye   COMPLICATIONS: none   INDICATIONS: 27 year old year old patient status post open globe repair, with diagnosis of choroidals, retinal detachment, posterior wound could not be closed during the open globe repair right eye     DESCRIPTION OF THE PROCEDURE   The patient was brought into the OR where general anesthesia was given by the anesthesia department.      The eye was then prepped and draped in the usual fashion for ophthalmic surgery, including the installation of one drop of povidone iodine.  Attention was then turned to the vitrectomy. Marks were made on the sclera inferotemporally, superotemporally, and superonasally 3.5 mm posterior to the limbus. The 23g transscleral cannulas were inserted through the sclera using the trocars. The infusion cannula was connected inferotemporally and directly visualized to verify it was in the correct location. The vitrector handpiece and endoilluiminator were placed in the eye. Upon entering the eye it was noted that the patient had vitreous hemorrhage and a total retinal detachment. After vitrectomy it was noted the posterior wound was closed, but involved the temporal macula. There was retina entrapped into the posterior wound, there was a large tear at the temporal macula and there were temporal overlying non-kissing choroidals involving the macula and proliferative vitreoretinopathy  over the area of entrapped retina. There was heme over the macula and superior to the macula there was subretinal hemorrhage. Next, the soft tip was used to attempt remove the pre-retina heme. Next, A membrane peeling was performed over the macula. Perfluoroctane liquid (PFO) was also place to attempted flatten the retina, but significant retina remained contracted temporal to the macula and the decision was to proceed with retinectomy. An endodiathermia was used to marked the edges of the posterior tear and the area of retinectomy. Next, a temporal and superior retinectomy was performed with the vitrector in scissor mode. Next, an air fluid exchange was performed, the retina was reattached. There were remaining temporal choroidals. Next, 1000cs silicone oil was infused into the eye. The sclerotomies were sutured with 6-0 plain gut suture. The pressure was checked and verified to be appropriate. The conjunctiva was closed with 6-0 plain suture. A retrobulbar block consistent of a 1:1 mixtures of 2% Lidocaine and 0.75 % of marcaine and wydase was administered. Subconjunctival injections of Ancef and decadron were administered. The lid speculum was removed. The eye was cleaned with wet and dry gauze. A drop of atropine and maxitrol ointment was placed in the eye. An eye pad and ojeda shield were taped over the eye. The patient tolerated well the procedure and was discharge to the post-operative unit in stable conditions with no complications.  The surgery was assisted by Dr. Tena MD. Due to the delicate and complex nature of this surgery, Dr. Madsen was required. He assisted with the vitrectomy. I was present for the entire surgery.

## 2018-09-04 NOTE — ANESTHESIA CARE TRANSFER NOTE
Patient: Willam Mills    Procedure(s):  Right Eye 23 Parsplana Vitrectomy, Fluid/Gas Exchange, Endolaser, Silicone Oil, Retinectomy - Wound Class: I-Clean    Diagnosis: Ruptured Globe  Diagnosis Additional Information: No value filed.    Anesthesia Type:   General, LMA     Note:  Airway :Room Air  Patient transferred to:PACU  Comments: Uneventful transport to PACU; VSS; Report given to RN; Pt comfortable; IV patent: pt exchanging wellHandoff Report: Identifed the Patient, Identified the Reponsible Provider, Reviewed the pertinent medical history, Discussed the surgical course, Reviewed Intra-OP anesthesia mangement and issues during anesthesia, Set expectations for post-procedure period and Allowed opportunity for questions and acknowledgement of understanding      Vitals: (Last set prior to Anesthesia Care Transfer)    CRNA VITALS  9/4/2018 1547 - 9/4/2018 1623      9/4/2018             Pulse: 77    SpO2: 100 %    Resp Rate (observed): (!)  1                Electronically Signed By: ROBIN Beckett CRNA  September 4, 2018  4:23 PM

## 2018-09-04 NOTE — IP AVS SNAPSHOT
[facility]    [address] [place] [address]    Phone:  [phone]    Fax:  [phone]                                       After Visit Summary   9/4/2018    Willam Mills    MRN: [id number]           After Visit Summary Signature Page     I have received my discharge instructions, and my questions have been answered. I have discussed any challenges I see with this plan with the nurse or doctor.    ..........................................................................................................................................  Patient/Patient Representative Signature      ..........................................................................................................................................  Patient Representative Print Name and Relationship to Patient    ..................................................               ................................................  Date                                            Time    ..........................................................................................................................................  Reviewed by Signature/Title    ...................................................              ..............................................  Date                                                            Time          22EPIC Rev 08/18

## 2018-09-05 ENCOUNTER — OFFICE VISIT (OUTPATIENT)
Dept: OPHTHALMOLOGY | Facility: CLINIC | Age: 28
End: 2018-09-05
Attending: OPHTHALMOLOGY
Payer: OTHER MISCELLANEOUS

## 2018-09-05 DIAGNOSIS — Z48.810 AFTERCARE FOLLOWING SURGERY OF A SENSORY ORGAN: Primary | ICD-10-CM

## 2018-09-05 PROCEDURE — G0463 HOSPITAL OUTPT CLINIC VISIT: HCPCS | Mod: ZF

## 2018-09-05 ASSESSMENT — TONOMETRY
IOP_METHOD: ICARE
OS_IOP_MMHG: 11
OD_IOP_MMHG: 08

## 2018-09-05 ASSESSMENT — VISUAL ACUITY
OD_SC: HM
OS_SC: 20/20
METHOD: SNELLEN - LINEAR

## 2018-09-05 NOTE — PROGRESS NOTES
Postoperative day 1 status post 23 g Pars plana vitrectomy (PPV); retinectomy, endolaser air fluid exchange; Silicone Oil 1000 cs right eye   For traumatic Retinal detachment macula off with dragging of the macula temporally, choroidals,  With posterior extension to macula of initial scleral rupture    Slept well  Retina attached under oil. Irregular macula contour with dragging temporally  Doing well    Plan:  Position: face down  No heavy lifting   Benoit shield at all times  Retina detachment and endophthalmitis precautions were discussed with the patient and was asked to return if any of the those occur    Medications to operative eye  Maxitrol (pink top) four times a day    Maxitrol oint at bedtime  Atropine (red top) once a day     Follow up in one week with optos pictures and Optical Coherence Tomography   ~~~~~~~~~~~~~~~~~~~~~~~~~~~~~~~~~~   Complete documentation of historical and exam elements from today's encounter can be found in the full encounter summary report (not reduplicated in this progress note).  I personally obtained the chief complaint(s) and history of present illness.  I confirmed and edited as necessary the review of systems, past medical/surgical history, family history, social history, and examination findings as documented by others; and I examined the patient myself.  I personally reviewed the relevant tests, images, and reports as documented above.  I formulated and edited as necessary the assessment and plan and discussed the findings and management plan with the patient and family    Norma Aleman MD  .  Retina Service   Department of Ophthalmology and Visual Neurosciences   [facility]  Phone: [phone]   Fax: [phone]         \

## 2018-09-05 NOTE — NURSING NOTE
Chief Complaints and History of Present Illnesses   Patient presents with     Post Op (Ophthalmology) Right Eye     s/p  (25 g PPV/possSB/ possibly lensectomy/ retinectomy/ choroidal drainage EL/FAX/SO vs gas )     HPI    Affected eye(s):  Right   Symptoms:        Duration:  1 day   Frequency:  Constant       Do you have eye pain now?:  No      Comments:  Pt. States that yesterday went well.  Did have a slight headache yesterday and into today.  Lala RODNEY 10:42 AM September 5, 2018

## 2018-09-05 NOTE — MR AVS SNAPSHOT
After Visit Summary   2018    MEGHAN Mills    MRN: [id number]           Patient Information     Date Of Birth          1990        Visit Information        Provider Department      2018 10:00 AM [facility]a [facility] MD [facility]         Follow-ups after your visit        Your next 10 appointments already scheduled     Sep 13, 2018 11:15 AM CDT   RETURN RETINA with Norma Aleman MD   Eye Clinic ([facility])    [address]   Fl Clin [address] [place] [id number]   [phone]              Who to contact     Please call your clinic at [phone] to:    Ask questions about your health    Make or cancel appointments    Discuss your medicines    Learn about your test results    Speak to your doctor            Additional Information About Your Visit        MyChart Information     flexReceipts is an electronic gateway that provides easy, online access to your medical records. With flexReceipts, you can request a clinic appointment, read your test results, renew a prescription or communicate with your care team.     To sign up for [employer] visit the website at [email or web address]   You will be asked to enter the access code listed below, as well as some personal information. Please follow the directions to create your username and password.     Your access code is: [email or web address]  Expires: 2018  8:19 AM     Your access code will  in 90 days. If you need help or a new code, please contact your [facility] or call [phone] for assistance.        Care EveryWhere ID     This is your Care EveryWhere ID. This could be used by other organizations to access your [place] medical records  [id number]         Blood Pressure from Last 3 Encounters:   18 128/78   18 133/75   18 114/63    Weight from Last 3 Encounters:   18 93 kg (205 lb)   18 92.5 kg (203 lb 14.8 oz)    08/22/18 93 kg (205 lb)              Today, you had the following     No orders found for display       Primary Care Provider Fax #    Physician No Ref-Primary [phone]       No address on file        Equal Access to Services     SIVAN GIORDANOCHITRA : Marbin townsend lula carson Colon, wabekada luqadaha, qaybta kaalmada amparo, duc dickbrendan jarett. So [facility] [phone].    ATENCIÓN: Si habla español, tiene a stacy disposición servicios gratuitos de asistencia lingüística. Llame al [phone].    We comply with applicable federal civil rights laws and [place] laws. We do not discriminate on the basis of race, color, national origin, age, disability, sex, sexual orientation, or gender identity.            Thank you!     Thank you for choosing [facility]IC  for your care. Our goal is always to provide you with excellent care. Hearing back from our patients is one way we can continue to improve our services. Please take a few minutes to complete the written survey that you may receive in the mail after your visit with us. Thank you!             Your Updated Medication List - Protect others around you: Learn how to safely use, store and throw away your medicines at www.disposemymeds.org.          This list is accurate as of 9/5/18 11:33 AM.  Always use your most recent med list.                   Brand Name Dispense Instructions for use Diagnosis    * atropine 1 % ophthalmic solution     1 Bottle    Place 1-2 drops into the right eye 2 times daily    Postoperative eye state       * atropine 1 % ophthalmic solution     1 Bottle    Place 1-2 drops into the right eye daily    Ruptured globe of left eye, subsequent encounter       erythromycin ophthalmic ointment    ROMYCIN    1 Tube    Place 0.5 inches into the right eye 3 times daily    Postoperative eye state       IBUPROFEN PO           * moxifloxacin 0.5 % ophthalmic solution    VIGAMOX    1 Bottle    Place 1 drop into the right eye 4 times daily     Postoperative eye state       * moxifloxacin 0.5 % ophthalmic solution    VIGAMOX    1 Bottle    Place 1 drop into the right eye 4 times daily    Ruptured globe of left eye, subsequent encounter       Multi-vitamin Tabs tablet      Take 1 tablet by mouth daily        neomycin-polymixin-dexamethasone ophthalmic suspension    MAXITROL    5 mL    Apply 1 drop to eye 4 times daily Instill into operative eye(s) per physician instructions.    Right eye injury, subsequent encounter       * oxyCODONE-acetaminophen 5-325 MG per tablet    PERCOCET    20 tablet    Take 1 tablet by mouth every 6 hours as needed for pain    Right eye injury, sequela       * oxyCODONE-acetaminophen 5-325 MG per tablet    PERCOCET    28 tablet    Take 1 tablet by mouth every 6 hours as needed for pain    Ruptured globe of left eye, subsequent encounter       * prednisoLONE acetate 1 % ophthalmic susp    PRED FORTE    1 Bottle    Place 1 drop into the right eye every 2 hours (while awake)    Postoperative eye state       * prednisoLONE acetate 1 % ophthalmic susp    PRED FORTE    1 Bottle    Place 1-2 drops into the right eye 4 times daily    Ruptured globe of left eye, subsequent encounter       * Notice:  This list has 8 medication(s) that are the same as other medications prescribed for you. Read the directions carefully, and ask your doctor or other care provider to review them with you.

## 2018-09-11 DIAGNOSIS — S05.32XD RUPTURED GLOBE OF LEFT EYE, SUBSEQUENT ENCOUNTER: Primary | ICD-10-CM

## 2018-09-13 ENCOUNTER — OFFICE VISIT (OUTPATIENT)
Dept: OPHTHALMOLOGY | Facility: CLINIC | Age: 28
End: 2018-09-13
Attending: OPHTHALMOLOGY
Payer: OTHER MISCELLANEOUS

## 2018-09-13 DIAGNOSIS — S05.32XD RUPTURED GLOBE OF LEFT EYE, SUBSEQUENT ENCOUNTER: ICD-10-CM

## 2018-09-13 DIAGNOSIS — H33.051 RECENT RETINAL DETACHMENT OF RIGHT EYE, TOTAL/SUBTOTAL: ICD-10-CM

## 2018-09-13 DIAGNOSIS — Z48.810 AFTERCARE FOLLOWING SURGERY OF A SENSORY ORGAN: Primary | ICD-10-CM

## 2018-09-13 PROCEDURE — 92134 CPTRZ OPH DX IMG PST SGM RTA: CPT | Mod: ZF | Performed by: OPHTHALMOLOGY

## 2018-09-13 PROCEDURE — G0463 HOSPITAL OUTPT CLINIC VISIT: HCPCS | Mod: ZF

## 2018-09-13 PROCEDURE — 92250 FUNDUS PHOTOGRAPHY W/I&R: CPT | Mod: ZF | Performed by: OPHTHALMOLOGY

## 2018-09-13 RX ORDER — OXYCODONE AND ACETAMINOPHEN 5; 325 MG/1; MG/1
1 TABLET ORAL EVERY 6 HOURS PRN
Qty: 20 TABLET | Refills: 0 | Status: SHIPPED | OUTPATIENT
Start: 2018-09-13 | End: 2019-03-11

## 2018-09-13 RX ORDER — NEOMYCIN SULFATE, POLYMYXIN B SULFATE AND DEXAMETHASONE 3.5; 10000; 1 MG/ML; [USP'U]/ML; MG/ML
1-2 SUSPENSION/ DROPS OPHTHALMIC 4 TIMES DAILY
Qty: 1 BOTTLE | Refills: 2 | Status: SHIPPED | OUTPATIENT
Start: 2018-09-13 | End: 2019-03-11

## 2018-09-13 ASSESSMENT — CONF VISUAL FIELD
OD_SUPERIOR_NASAL_RESTRICTION: 1
OS_NORMAL: 1
OD_SUPERIOR_TEMPORAL_RESTRICTION: 3
METHOD: COUNTING FINGERS
OD_INFERIOR_NASAL_RESTRICTION: 1

## 2018-09-13 ASSESSMENT — TONOMETRY
OD_IOP_MMHG: 17
IOP_METHOD: ICARE
OS_IOP_MMHG: 15

## 2018-09-13 ASSESSMENT — VISUAL ACUITY
OD_SC: CF @ 3'
METHOD: SNELLEN - LINEAR
OS_SC: 20/20

## 2018-09-13 NOTE — PATIENT INSTRUCTIONS
Current Gtts and medications  Medications to operative eye  Maxitrol (pink top) three times a day for 1 week, 2 x daily x 1 week, 1 times daily x 1  week, stop   Maxitrol oint at bedtime  Atropine (red top) once a day x 1 week and stop  Takes Oxycodone-acetaminophen for pain control about once per day

## 2018-09-13 NOTE — LETTER
September 13, 2018      Re: Shane Mills   1990    To Whom It May Concern:    This is to confirm that the above patient was seen on 9/13/2018.  Shane Mills is unable to return to baseball or any contact sports til further notice due to his traumatic eye injury. Will keep you updated as time allows.    Thank you for your cooperation in this matter.  Please do not hesitate to contact me if you have any further questions.    Sincerely,      FRED RAMSEY

## 2018-09-13 NOTE — PROGRESS NOTES
Postoperative week 1 status post 23 g Pars plana vitrectomy (PPV); retinectomy, endolaser air fluid exchange; Silicone Oil 1000 cs right eye 9.4.18  For traumatic Retinal detachment macula off with dragging of the macula temporally, choroidals,  With posterior extension to macula of initial scleral rupture  Feels better; some improvement of vision and decreased pain    Interval history  - Doing well with pain; usually most uncomfortable at 6pm.   - Bright light increases discomfort and irritation  - No new flashes or floaters  - Endorses a nasal hemifield scotoma but notes vision is improving    - Gtts helping patient relieve irritation     Current Gtts and medications  Medications to operative eye  Maxitrol (pink top) three times a day and slow tapering   Maxitrol oint at bedtime  Atropine (red top) once a day x 1 week and stop  Takes Oxycodone-acetaminophen for pain control about once per day    OCULAR IMAGING  OCT 9-13-18  right eye: central macula atrophic changes; scarring. Inf to macula subretinal fluid   left eye: normal     PHOTOS 9-13-18  right eye: consistent with exam  left eye: normal     Autofluorescence: hypoautofluorescence in macula, scarring and heme    Plan:  No heavy lifting   Benoit shield at all times  Retina detachment and endophthalmitis precautions were discussed with the patient and was asked to return if any of the those occur    Follow up in 3 weeks with  Optical Coherence Tomography ; sooner as needed     Medications to operative eye  Maxitrol (pink top) three times a day and slow tapering   Maxitrol oint at bedtime  Atropine (red top) once a day x 1 week and stop  Takes Oxycodone-acetaminophen for pain control about once per day    ~~~~~~~~~~~~~~~~~~~~~~~~~~~~~~~~~~   Complete documentation of historical and exam elements from today's encounter can be found in the full encounter summary report (not reduplicated in this progress note).  I personally obtained the chief complaint(s) and  history of present illness.  I confirmed and edited as necessary the review of systems, past medical/surgical history, family history, social history, and examination findings as documented by others; and I examined the patient myself.  I personally reviewed the relevant tests, images, and reports as documented above.  I personally reviewed the ophthalmic test(s) associated with this encounter, agree with the interpretation(s) as documented by the resident/fellow, and have edited the corresponding report(s) as necessary.   I formulated and edited as necessary the assessment and plan and discussed the findings and management plan with the patient and family    Norma Aleman MD   of Ophthalmology.  Retina Service   Department of Ophthalmology and Visual Neurosciences   [facility]  Phone: [phone]   Fax: [phone]

## 2018-09-13 NOTE — NURSING NOTE
Chief Complaints and History of Present Illnesses   Patient presents with     Eye Problem Right Eye     POD#9 s/p 23 g Pars plana vitrectomy (PPV); retinectomy, endolaser air fluid exchange; Silicone Oil 1000 cs     HPI    Affected eye(s):  Right   Symptoms:     Blurred vision   No decreased vision   No distorted vision   No foreign body sensation   No tearing   No Dryness   Itching   No eye discharge      Duration:  9 days   Frequency:  Daily       Do you have eye pain now?:  Yes   Location:  OD   Pain Level:  Moderate Pain (5)   Pain Duration:  9 days   Pain Frequency:  Constant   Pain Characteristics:  Aching      Comments:  Vision seems to be improving since LV. Wears ojeda shield during the night and for about 5hrs each day for eye protection.   Pain is tolerable for most of the day, takes oral pain reliever at night close to 6p.     Only complaint is itchy RE. Denies any accompanying symptoms. LE asymptomatic.   Ocular meds: predforte qid right eye, atropine qday right eye, EES demarco at bedtime right eye     Julieta Henao COT 11:04 AM September 13, 2018

## 2018-09-13 NOTE — MR AVS SNAPSHOT
After Visit Summary   9/13/2018    Shane Mills    MRN: [id number]           Patient Information     Date Of Birth          1990        Visit Information        Provider Department      9/13/2018 11:15 AM Noram Aleman MD [facility]        Today's Diagnoses     Ruptured globe of left eye, subsequent encounter        Ruptured globe of left eye, subsequent encounter - Right Eye          Care Instructions    Current Gtts and medications  Medications to operative eye  Maxitrol (pink top) three times a day for 1 week, 2 x daily x 1 week, 1 times daily x 1  week, stop   Maxitrol oint at bedtime  Atropine (red top) once a day x 1 week and stop  Takes Oxycodone-acetaminophen for pain control about once per day            Follow-ups after your visit        Your next 10 appointments already scheduled     Oct 03, 2018 11:15 AM CDT   RETURN RETINA with Norma Aleman MD   Eye Clinic ([facility])    [address]  [facility] [address]   [phone]              Future tests that were ordered for you today     Open Future Orders        Priority Expected Expires Ordered    OCT Retina Spectralis OU (both eyes) Routine  3/13/2020 9/13/2018            Who to contact     Please call your clinic at [phone] to:    Ask questions about your health    Make or cancel appointments    Discuss your medicines    Learn about your test results    Speak to your doctor            Additional Information About Your Visit        MyChart Information     [employer] is an electronic gateway that provides easy, online access to your medical records. With Voices, you can request a clinic appointment, read your test results, renew a prescription or communicate with your care team.     To sign up for [employer] visit the website at [email or web address]   You will be asked to enter the access code listed below, as well as some personal  information. Please follow the directions to create your username and password.     Your access code is: [email or web address]  Expires: 2018  8:19 AM     Your access code will  in 90 days. If you need help or a new code, please contact your [facility] or call [phone] for assistance.        Care EveryWhere ID     This is your Care EveryWhere ID. This could be used by other organizations to access your [facility] medical records  [id number]         Blood Pressure from Last 3 Encounters:   18 128/78   18 133/75   18 114/63    Weight from Last 3 Encounters:   18 93 kg (205 lb)   18 92.5 kg (203 lb 14.8 oz)   18 93 kg (205 lb)              We Performed the Following     Fundus Photos OU (both eyes)     OCT Retina Spectralis OU (both eyes)          Today's Medication Changes          These changes are accurate as of 18 12:25 PM.  If you have any questions, ask your nurse or doctor.               These medicines have changed or have updated prescriptions.        Dose/Directions    * neomycin-polymixin-dexamethasone ophthalmic suspension   Commonly known as:  MAXITROL   This may have changed:  Another medication with the same name was added. Make sure you understand how and when to take each.   Used for:  Right eye injury, subsequent encounter   Changed by:  Norma Aleman MD        Dose:  1 drop   Apply 1 drop to eye 4 times daily Instill into operative eye(s) per physician instructions.   Quantity:  5 mL   Refills:  0       * neomycin-polymyxin-dexamethasone 3.5-19174-0.1 Susp ophthalmic susp   Commonly known as:  MAXITROL   This may have changed:  You were already taking a medication with the same name, and this prescription was added. Make sure you understand how and when to take each.   Used for:  Ruptured globe of left eye, subsequent encounter   Changed by:  Norma Aleman MD        Dose:  1-2 drop   Place 1-2 drops  into the right eye 4 times daily   Quantity:  1 Bottle   Refills:  2       * Notice:  This list has 2 medication(s) that are the same as other medications prescribed for you. Read the directions carefully, and ask your doctor or other care provider to review them with you.         Where to get your medicines      These medications were sent to [facility] [address] [facility] [place] [address] [facility]  [address] [facility] [place] [address]     Phone:  [phone]     neomycin-polymyxin-dexamethasone 3.5-70557-5.1 Susp ophthalmic susp         Some of these will need a paper prescription and others can be bought over the counter.  Ask your nurse if you have questions.     Bring a paper prescription for each of these medications     oxyCODONE-acetaminophen 5-325 MG per tablet               Information about OPIOIDS     PRESCRIPTION OPIOIDS: WHAT YOU NEED TO KNOW   We gave you an opioid (narcotic) pain medicine. It is important to manage your pain, but opioids are not always the best choice. You should first try all the other options your care team gave you. Take this medicine for as short a time (and as few doses) as possible.    Some activities can increase your pain, such as bandage changes or therapy sessions. It may help to take your pain medicine 30 to 60 minutes before these activities. Reduce your stress by getting enough sleep, working on hobbies you enjoy and practicing relaxation or meditation. Talk to your care team about ways to manage your pain beyond prescription opioids.    These medicines have risks:    DO NOT drive when on new or higher doses of pain medicine. These medicines can affect your alertness and reaction times, and you could be arrested for driving under the influence (DUI). If you need to use opioids long-term, talk to your care team about driving.    DO NOT operate heavy machinery    DO NOT do any other dangerous activities while taking these medicines.    DO NOT drink any alcohol while  taking these medicines.     If the opioid prescribed includes acetaminophen, DO NOT take with any other medicines that contain acetaminophen. Read all labels carefully. Look for the word  acetaminophen  or  Tylenol.  Ask your pharmacist if you have questions or are unsure.    You can get addicted to pain medicines, especially if you have a history of addiction (chemical, alcohol or substance dependence). Talk to your care team about ways to reduce this risk.    All opioids tend to cause constipation. Drink plenty of water and eat foods that have a lot of fiber, such as fruits, vegetables, prune juice, apple juice and high-fiber cereal. Take a laxative (Miralax, milk of magnesia, Colace, Senna) if you don t move your bowels at least every other day. Other side effects include upset stomach, sleepiness, dizziness, throwing up, tolerance (needing more of the medicine to have the same effect), physical dependence and slowed breathing.    Store your pills in a secure place, locked if possible. We will not replace any lost or stolen medicine. If you don t finish your medicine, please throw away (dispose) as directed by your pharmacist. The [place] Pollution Control Agency has more information about safe disposal: [email or web address]/living-[email or web address]/[email or web address]-[email or web address]-medications         Primary Care Provider Fax #    Physician No Ref-Primary [phone]       No address on file        Equal Access to Services     SIVAN OLIVAS : Marbin byrd Solinh, waaxda luqadaha, qaybta kaalmada adebrendon, duc denson. So [facility] [phone].    ATENCIÓN: Si habla español, tiene a stacy disposición servicios gratuitos de asistencia lingüística. Llame al [phone].    We comply with applicable federal civil rights laws and [place] laws. We do not discriminate on the basis of race, color, national origin, age, disability, sex, sexual orientation, or gender identity.            Thank you!      Thank you for choosing EYE CLINIC  for your care. Our goal is always to provide you with excellent care. Hearing back from our patients is one way we can continue to improve our services. Please take a few minutes to complete the written survey that you may receive in the mail after your visit with us. Thank you!             Your Updated Medication List - Protect others around you: Learn how to safely use, store and throw away your medicines at www.disposemymeds.org.          This list is accurate as of 9/13/18 12:25 PM.  Always use your most recent med list.                   Brand Name Dispense Instructions for use Diagnosis    atropine 1 % ophthalmic solution     1 Bottle    Place 1-2 drops into the right eye daily    Ruptured globe of left eye, subsequent encounter       erythromycin ophthalmic ointment    ROMYCIN    1 Tube    Place 0.5 inches into the right eye 3 times daily    Postoperative eye state       IBUPROFEN PO           moxifloxacin 0.5 % ophthalmic solution    VIGAMOX    1 Bottle    Place 1 drop into the right eye 4 times daily    Ruptured globe of left eye, subsequent encounter       Multi-vitamin Tabs tablet      Take 1 tablet by mouth daily        * neomycin-polymixin-dexamethasone ophthalmic suspension    MAXITROL    5 mL    Apply 1 drop to eye 4 times daily Instill into operative eye(s) per physician instructions.    Right eye injury, subsequent encounter       * neomycin-polymyxin-dexamethasone 3.5-30840-7.1 Susp ophthalmic susp    MAXITROL    1 Bottle    Place 1-2 drops into the right eye 4 times daily    Ruptured globe of left eye, subsequent encounter       oxyCODONE-acetaminophen 5-325 MG per tablet    PERCOCET    20 tablet    Take 1 tablet by mouth every 6 hours as needed for pain    Ruptured globe of left eye, subsequent encounter       prednisoLONE acetate 1 % ophthalmic susp    PRED FORTE    1 Bottle    Place 1-2 drops into the right eye 4 times daily    Ruptured globe of left eye,  subsequent encounter       * Notice:  This list has 2 medication(s) that are the same as other medications prescribed for you. Read the directions carefully, and ask your doctor or other care provider to review them with you.

## 2018-10-03 ENCOUNTER — OFFICE VISIT (OUTPATIENT)
Dept: OPHTHALMOLOGY | Facility: CLINIC | Age: 28
End: 2018-10-03
Attending: OPHTHALMOLOGY
Payer: OTHER MISCELLANEOUS

## 2018-10-03 ENCOUNTER — TELEPHONE (OUTPATIENT)
Dept: OPHTHALMOLOGY | Facility: CLINIC | Age: 28
End: 2018-10-03

## 2018-10-03 DIAGNOSIS — H33.051 RECENT RETINAL DETACHMENT OF RIGHT EYE, TOTAL/SUBTOTAL: Primary | ICD-10-CM

## 2018-10-03 DIAGNOSIS — S05.32XD RUPTURED GLOBE OF LEFT EYE, SUBSEQUENT ENCOUNTER: ICD-10-CM

## 2018-10-03 PROCEDURE — G0463 HOSPITAL OUTPT CLINIC VISIT: HCPCS | Mod: ZF

## 2018-10-03 PROCEDURE — 92134 CPTRZ OPH DX IMG PST SGM RTA: CPT | Mod: ZF | Performed by: OPHTHALMOLOGY

## 2018-10-03 ASSESSMENT — TONOMETRY
OD_IOP_MMHG: 13
OS_IOP_MMHG: 17
IOP_METHOD: TONOPEN

## 2018-10-03 ASSESSMENT — VISUAL ACUITY
METHOD: SNELLEN - LINEAR
OD_SC: CF @ 1FT ECC
OS_SC: 20/15

## 2018-10-03 ASSESSMENT — CONF VISUAL FIELD
OD_SUPERIOR_NASAL_RESTRICTION: 1
OD_INFERIOR_NASAL_RESTRICTION: 1
OD_INFERIOR_TEMPORAL_RESTRICTION: 1
OD_SUPERIOR_TEMPORAL_RESTRICTION: 1

## 2018-10-03 NOTE — TELEPHONE ENCOUNTER
Patient is scheduled for surgery with Dr. Aleman      Spoke or left message with: patient    Date of Surgery: 10/09/18    Location: [facility]    Informed patient they will need an adult  Yes    Pre-op with surgeon (if applicable): JACOB    H&P: Scheduled with Dr. Keith Baumgarten [phone] patient will call to schedule appointment    Additional imaging/appointments: JACOB    Surgery packet: gave to patient 10/03/18    Additional comments: Advised RN will call with instructions and arrival time 1 - 3 days prior. Advise to have new preop prior one is no longer valid.

## 2018-10-03 NOTE — NURSING NOTE
Chief Complaints and History of Present Illnesses   Patient presents with     Post Op (Ophthalmology) Right Eye     S/P RD Repair 9-4-18     HPI    Last Eye Exam:  9/13/18   Affected eye(s):  Right   Symptoms:     Blurred vision      Duration:  3 weeks   Frequency:  Constant       Do you have eye pain now?:  No      Comments:  Pt feels that vision is the same. Denies any pain. No problems using drops and last drop used this morning. VLADIMIR PARKER, COA 11:15 AM 10/03/2018

## 2018-10-03 NOTE — PROGRESS NOTES
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 Inf to macula subretinal fluid - worse then last visit  left eye: normal     PHOTOS 10/03/18   right eye: inf Retinal detachment ; posterior and temporal scarring   left eye: normal     Autofluorescence 10/03/18 : hypoautofluorescence in macula, scarring and heme    Plan: discussed with patient at length risks, benefits and alternatives of additional surgeries.  risks discussed 1:1000 risk of infection/bleed/loss of eye; 1:100 risk of RD and need for further surgery.Patient aware of prolonged healing after retinal surgery (up to a year after surgery) as well as possibility of air/gas/SO  instillation into eye. Patient agreed to proceed with surgery. Poor visual prognosis despite multiple surgeries because of severity of the trauma    Plan for 25 g Pars plana vitrectomy (PPV)/ Pars plana lensectomy (PPL)/ Silicone Oil removal endolaser/ air fluid exchange/ membrane peel/  Silicone Oil placement right eye   Retina detachment and endophthalmitis precautions were discussed with the patient and was asked to return if any of the those occur    Follow up in 3 weeks with  Optical Coherence Tomography ; sooner as needed     Medications to operative eye  Maxitrol (pink top) three times a day and slow  tapering   Maxitrol oint at bedtime  Atropine (red top) once a day x 1 week and stop  Takes Oxycodone-acetaminophen for pain control about once per day- recommend to discontinue     ~~~~~~~~~~~~~~~~~~~~~~~~~~~~~~~~~~   Complete documentation of historical and exam elements from today's encounter can be found in the full encounter summary report (not reduplicated in this progress note).  I personally obtained the chief complaint(s) and history of present illness.  I confirmed and edited as necessary the review of systems, past medical/surgical history, family history, social history, and examination findings as documented by others; and I examined the patient myself.  I personally reviewed the relevant tests, images, and reports as documented above.  I personally reviewed the ophthalmic test(s) associated with this encounter, agree with the interpretation(s) as documented by the resident/fellow, and have edited the corresponding report(s) as necessary.   I formulated and edited as necessary the assessment and plan and discussed the findings and management plan with the patient and family    Norma Aleman MD   of Ophthalmology.  Retina Service   Department of Ophthalmology and Visual Neurosciences   [facility]  Phone: [phone]   Fax: [phone]

## 2018-10-03 NOTE — MR AVS SNAPSHOT
After Visit Summary   10/3/2018    Shane Mills    MRN: [id number]           Patient Information     Date Of Birth          1990        Visit Information        Provider Department      10/3/2018 11:15 AM Norma [facility]man MD [facility]        Today's Diagnoses     Recent retinal detachment of right eye, total/subtotal    -  1    Ruptured globe of left eye, subsequent encounter           Follow-ups after your visit        Your next 10 appointments already scheduled     Oct 10, 2018  8:00 AM CDT   Post-Op with Norma Aleman MD   Eye Clinic ([facility])    [facility] [address]  [facility] [id number] [phone]              Who to contact     Please call your clinic at [phone] to:    Ask questions about your health    Make or cancel appointments    Discuss your medicines    Learn about your test results    Speak to your doctor            Additional Information About Your Visit        Care EveryWhere ID     This is your Care EveryWhere ID. This could be used by other organizations to access your [facility] medical records  [id number]         Blood Pressure from Last 3 Encounters:   09/04/18 128/78   08/23/18 133/75   08/22/18 114/63    Weight from Last 3 Encounters:   09/04/18 93 kg (205 lb)   08/22/18 92.5 kg (203 lb 14.8 oz)   08/22/18 93 kg (205 lb)              We Performed the Following     Fundus Photos OD (right eye)     OCT Retina Spectralis OU (both eyes)     Stephanie-Operative Worksheet (Retina)        Primary Care Provider Fax #    Physician No Ref-Primary [phone]       No address on file        Equal Access to Services     SIVAN OLIVAS AH: Hadii kristian rosaso Solinh, waaxda luqadaha, qaybta kaalmada adeegyada, duc denson. So [facility] [phone].    ATENCIÓN: Si habla español, tiene a stacy disposición servicios gratuitos de asistencia lingüística. Llame al  [phone].    We comply with applicable federal civil rights laws and [place] laws. We do not discriminate on the basis of race, color, national origin, age, disability, sex, sexual orientation, or gender identity.            Thank you!     Thank you for choosing [facility]YE CLINIC  for your care. Our goal is always to provide you with excellent care. Hearing back from our patients is one way we can continue to improve our services. Please take a few minutes to complete the written survey that you may receive in the mail after your visit with us. Thank you!             Your Updated Medication List - Protect others around you: Learn how to safely use, store and throw away your medicines at www.disposemymeds.org.          This list is accurate as of 10/3/18  2:23 PM.  Always use your most recent med list.                   Brand Name Dispense Instructions for use Diagnosis    atropine 1 % ophthalmic solution     1 Bottle    Place 1-2 drops into the right eye daily    Ruptured globe of left eye, subsequent encounter       erythromycin ophthalmic ointment    ROMYCIN    1 Tube    Place 0.5 inches into the right eye 3 times daily    Postoperative eye state       IBUPROFEN PO           moxifloxacin 0.5 % ophthalmic solution    VIGAMOX    1 Bottle    Place 1 drop into the right eye 4 times daily    Ruptured globe of left eye, subsequent encounter       Multi-vitamin Tabs tablet      Take 1 tablet by mouth daily        * neomycin-polymixin-dexamethasone ophthalmic suspension    MAXITROL    5 mL    Apply 1 drop to eye 4 times daily Instill into operative eye(s) per physician instructions.    Right eye injury, subsequent encounter       * neomycin-polymyxin-dexamethasone 3.5-73882-0.1 Susp ophthalmic susp    MAXITROL    1 Bottle    Place 1-2 drops into the right eye 4 times daily    Ruptured globe of left eye, subsequent encounter       oxyCODONE-acetaminophen 5-325 MG per tablet    PERCOCET    20 tablet    Take 1 tablet by mouth  every 6 hours as needed for pain    Ruptured globe of left eye, subsequent encounter       prednisoLONE acetate 1 % ophthalmic susp    PRED FORTE    1 Bottle    Place 1-2 drops into the right eye 4 times daily    Ruptured globe of left eye, subsequent encounter       * Notice:  This list has 2 medication(s) that are the same as other medications prescribed for you. Read the directions carefully, and ask your doctor or other care provider to review them with you.

## 2018-10-08 ENCOUNTER — ANESTHESIA EVENT (OUTPATIENT)
Dept: SURGERY | Facility: AMBULATORY SURGERY CENTER | Age: 28
End: 2018-10-08

## 2018-10-09 ENCOUNTER — ANESTHESIA (OUTPATIENT)
Dept: SURGERY | Facility: AMBULATORY SURGERY CENTER | Age: 28
End: 2018-10-09

## 2018-10-09 ENCOUNTER — HOSPITAL ENCOUNTER (OUTPATIENT)
Facility: AMBULATORY SURGERY CENTER | Age: 28
End: 2018-10-09
Attending: OPHTHALMOLOGY

## 2018-10-09 ENCOUNTER — SURGERY (OUTPATIENT)
Age: 28
End: 2018-10-09

## 2018-10-09 VITALS
OXYGEN SATURATION: 98 % | RESPIRATION RATE: 16 BRPM | TEMPERATURE: 98 F | BODY MASS INDEX: 26.24 KG/M2 | DIASTOLIC BLOOD PRESSURE: 62 MMHG | SYSTOLIC BLOOD PRESSURE: 105 MMHG | WEIGHT: 198 LBS | HEIGHT: 73 IN | HEART RATE: 70 BPM

## 2018-10-09 DIAGNOSIS — H33.21 RIGHT RETINAL DETACHMENT: Primary | ICD-10-CM

## 2018-10-09 DEVICE — IMPLANTABLE DEVICE: Type: IMPLANTABLE DEVICE | Site: EYE | Status: FUNCTIONAL

## 2018-10-09 RX ORDER — SODIUM CHLORIDE, SODIUM LACTATE, POTASSIUM CHLORIDE, CALCIUM CHLORIDE 600; 310; 30; 20 MG/100ML; MG/100ML; MG/100ML; MG/100ML
INJECTION, SOLUTION INTRAVENOUS CONTINUOUS
Status: DISCONTINUED | OUTPATIENT
Start: 2018-10-09 | End: 2018-10-10 | Stop reason: HOSPADM

## 2018-10-09 RX ORDER — NALOXONE HYDROCHLORIDE 0.4 MG/ML
.1-.4 INJECTION, SOLUTION INTRAMUSCULAR; INTRAVENOUS; SUBCUTANEOUS
Status: DISCONTINUED | OUTPATIENT
Start: 2018-10-09 | End: 2018-10-10 | Stop reason: HOSPADM

## 2018-10-09 RX ORDER — LIDOCAINE HYDROCHLORIDE 20 MG/ML
INJECTION, SOLUTION INFILTRATION; PERINEURAL PRN
Status: DISCONTINUED | OUTPATIENT
Start: 2018-10-09 | End: 2018-10-09

## 2018-10-09 RX ORDER — ACETAMINOPHEN 325 MG/1
975 TABLET ORAL ONCE
Status: COMPLETED | OUTPATIENT
Start: 2018-10-09 | End: 2018-10-09

## 2018-10-09 RX ORDER — FENTANYL CITRATE 50 UG/ML
INJECTION, SOLUTION INTRAMUSCULAR; INTRAVENOUS PRN
Status: DISCONTINUED | OUTPATIENT
Start: 2018-10-09 | End: 2018-10-09

## 2018-10-09 RX ORDER — GABAPENTIN 300 MG/1
300 CAPSULE ORAL ONCE
Status: DISCONTINUED | OUTPATIENT
Start: 2018-10-09 | End: 2018-10-09 | Stop reason: HOSPADM

## 2018-10-09 RX ORDER — DEXAMETHASONE SODIUM PHOSPHATE 4 MG/ML
INJECTION, SOLUTION INTRA-ARTICULAR; INTRALESIONAL; INTRAMUSCULAR; INTRAVENOUS; SOFT TISSUE PRN
Status: DISCONTINUED | OUTPATIENT
Start: 2018-10-09 | End: 2018-10-09 | Stop reason: HOSPADM

## 2018-10-09 RX ORDER — KETOROLAC TROMETHAMINE 30 MG/ML
INJECTION, SOLUTION INTRAMUSCULAR; INTRAVENOUS PRN
Status: DISCONTINUED | OUTPATIENT
Start: 2018-10-09 | End: 2018-10-09

## 2018-10-09 RX ORDER — PROPOFOL 10 MG/ML
INJECTION, EMULSION INTRAVENOUS CONTINUOUS PRN
Status: DISCONTINUED | OUTPATIENT
Start: 2018-10-09 | End: 2018-10-09

## 2018-10-09 RX ORDER — OXYCODONE HYDROCHLORIDE 5 MG/1
5 TABLET ORAL EVERY 4 HOURS PRN
Status: DISCONTINUED | OUTPATIENT
Start: 2018-10-09 | End: 2018-10-10 | Stop reason: HOSPADM

## 2018-10-09 RX ORDER — CYCLOPENTOLATE HYDROCHLORIDE 10 MG/ML
1 SOLUTION/ DROPS OPHTHALMIC
Status: COMPLETED | OUTPATIENT
Start: 2018-10-09 | End: 2018-10-09

## 2018-10-09 RX ORDER — NEOMYCIN POLYMYXIN B SULFATES AND DEXAMETHASONE 3.5; 10000; 1 MG/ML; [USP'U]/ML; MG/ML
1 SUSPENSION/ DROPS OPHTHALMIC 4 TIMES DAILY
Qty: 5 ML | Refills: 0 | Status: SHIPPED | OUTPATIENT
Start: 2018-10-09 | End: 2019-03-11

## 2018-10-09 RX ORDER — ONDANSETRON 4 MG/1
4 TABLET, ORALLY DISINTEGRATING ORAL EVERY 30 MIN PRN
Status: DISCONTINUED | OUTPATIENT
Start: 2018-10-09 | End: 2018-10-10 | Stop reason: HOSPADM

## 2018-10-09 RX ORDER — FENTANYL CITRATE 50 UG/ML
25-50 INJECTION, SOLUTION INTRAMUSCULAR; INTRAVENOUS
Status: DISCONTINUED | OUTPATIENT
Start: 2018-10-09 | End: 2018-10-09 | Stop reason: HOSPADM

## 2018-10-09 RX ORDER — BALANCED SALT SOLUTION 6.4; .75; .48; .3; 3.9; 1.7 MG/ML; MG/ML; MG/ML; MG/ML; MG/ML; MG/ML
SOLUTION OPHTHALMIC PRN
Status: DISCONTINUED | OUTPATIENT
Start: 2018-10-09 | End: 2018-10-09 | Stop reason: HOSPADM

## 2018-10-09 RX ORDER — PHENYLEPHRINE HYDROCHLORIDE 25 MG/ML
1 SOLUTION/ DROPS OPHTHALMIC
Status: COMPLETED | OUTPATIENT
Start: 2018-10-09 | End: 2018-10-09

## 2018-10-09 RX ORDER — TROPICAMIDE 10 MG/ML
1 SOLUTION/ DROPS OPHTHALMIC
Status: COMPLETED | OUTPATIENT
Start: 2018-10-09 | End: 2018-10-09

## 2018-10-09 RX ORDER — SODIUM CHLORIDE, SODIUM LACTATE, POTASSIUM CHLORIDE, CALCIUM CHLORIDE 600; 310; 30; 20 MG/100ML; MG/100ML; MG/100ML; MG/100ML
500 INJECTION, SOLUTION INTRAVENOUS CONTINUOUS
Status: DISCONTINUED | OUTPATIENT
Start: 2018-10-09 | End: 2018-10-09 | Stop reason: HOSPADM

## 2018-10-09 RX ORDER — GABAPENTIN 300 MG/1
300 CAPSULE ORAL ONCE
Status: COMPLETED | OUTPATIENT
Start: 2018-10-09 | End: 2018-10-09

## 2018-10-09 RX ORDER — DEXAMETHASONE SODIUM PHOSPHATE 4 MG/ML
INJECTION, SOLUTION INTRA-ARTICULAR; INTRALESIONAL; INTRAMUSCULAR; INTRAVENOUS; SOFT TISSUE PRN
Status: DISCONTINUED | OUTPATIENT
Start: 2018-10-09 | End: 2018-10-09

## 2018-10-09 RX ORDER — ONDANSETRON 2 MG/ML
4 INJECTION INTRAMUSCULAR; INTRAVENOUS EVERY 30 MIN PRN
Status: DISCONTINUED | OUTPATIENT
Start: 2018-10-09 | End: 2018-10-10 | Stop reason: HOSPADM

## 2018-10-09 RX ORDER — OXYCODONE HCL 5 MG/5 ML
5 SOLUTION, ORAL ORAL EVERY 4 HOURS PRN
Status: DISCONTINUED | OUTPATIENT
Start: 2018-10-09 | End: 2018-10-10 | Stop reason: HOSPADM

## 2018-10-09 RX ORDER — MEPERIDINE HYDROCHLORIDE 25 MG/ML
12.5 INJECTION INTRAMUSCULAR; INTRAVENOUS; SUBCUTANEOUS
Status: DISCONTINUED | OUTPATIENT
Start: 2018-10-09 | End: 2018-10-10 | Stop reason: HOSPADM

## 2018-10-09 RX ORDER — GLYCOPYRROLATE 0.2 MG/ML
INJECTION, SOLUTION INTRAMUSCULAR; INTRAVENOUS PRN
Status: DISCONTINUED | OUTPATIENT
Start: 2018-10-09 | End: 2018-10-09

## 2018-10-09 RX ORDER — ACETAMINOPHEN 325 MG/1
975 TABLET ORAL ONCE
Status: DISCONTINUED | OUTPATIENT
Start: 2018-10-09 | End: 2018-10-09 | Stop reason: HOSPADM

## 2018-10-09 RX ORDER — PROPOFOL 10 MG/ML
INJECTION, EMULSION INTRAVENOUS PRN
Status: DISCONTINUED | OUTPATIENT
Start: 2018-10-09 | End: 2018-10-09

## 2018-10-09 RX ORDER — LIDOCAINE 40 MG/G
CREAM TOPICAL
Status: DISCONTINUED | OUTPATIENT
Start: 2018-10-09 | End: 2018-10-09 | Stop reason: HOSPADM

## 2018-10-09 RX ORDER — CELECOXIB 200 MG/1
200 CAPSULE ORAL ONCE
Status: COMPLETED | OUTPATIENT
Start: 2018-10-09 | End: 2018-10-09

## 2018-10-09 RX ORDER — HYDROMORPHONE HYDROCHLORIDE 1 MG/ML
.3-.5 INJECTION, SOLUTION INTRAMUSCULAR; INTRAVENOUS; SUBCUTANEOUS EVERY 10 MIN PRN
Status: DISCONTINUED | OUTPATIENT
Start: 2018-10-09 | End: 2018-10-10 | Stop reason: HOSPADM

## 2018-10-09 RX ORDER — ATROPINE SULFATE 10 MG/ML
SOLUTION/ DROPS OPHTHALMIC PRN
Status: DISCONTINUED | OUTPATIENT
Start: 2018-10-09 | End: 2018-10-09 | Stop reason: HOSPADM

## 2018-10-09 RX ORDER — ONDANSETRON 2 MG/ML
INJECTION INTRAMUSCULAR; INTRAVENOUS PRN
Status: DISCONTINUED | OUTPATIENT
Start: 2018-10-09 | End: 2018-10-09

## 2018-10-09 RX ADMIN — CYCLOPENTOLATE HYDROCHLORIDE 1 DROP: 10 SOLUTION/ DROPS OPHTHALMIC at 08:54

## 2018-10-09 RX ADMIN — TROPICAMIDE 1 DROP: 10 SOLUTION/ DROPS OPHTHALMIC at 08:53

## 2018-10-09 RX ADMIN — ATROPINE SULFATE 1 DROP: 10 SOLUTION/ DROPS OPHTHALMIC at 10:40

## 2018-10-09 RX ADMIN — GLYCOPYRROLATE 0.3 MG: 0.2 INJECTION, SOLUTION INTRAMUSCULAR; INTRAVENOUS at 10:18

## 2018-10-09 RX ADMIN — ACETAMINOPHEN 975 MG: 325 TABLET ORAL at 08:36

## 2018-10-09 RX ADMIN — LIDOCAINE HYDROCHLORIDE 100 MG: 20 INJECTION, SOLUTION INFILTRATION; PERINEURAL at 10:20

## 2018-10-09 RX ADMIN — ONDANSETRON 4 MG: 2 INJECTION INTRAMUSCULAR; INTRAVENOUS at 10:25

## 2018-10-09 RX ADMIN — Medication 0.2 ML: at 10:55

## 2018-10-09 RX ADMIN — CYCLOPENTOLATE HYDROCHLORIDE 1 DROP: 10 SOLUTION/ DROPS OPHTHALMIC at 08:42

## 2018-10-09 RX ADMIN — SODIUM CHLORIDE, SODIUM LACTATE, POTASSIUM CHLORIDE, CALCIUM CHLORIDE 500 ML: 600; 310; 30; 20 INJECTION, SOLUTION INTRAVENOUS at 08:35

## 2018-10-09 RX ADMIN — PHENYLEPHRINE HYDROCHLORIDE 1 DROP: 25 SOLUTION/ DROPS OPHTHALMIC at 08:34

## 2018-10-09 RX ADMIN — PHENYLEPHRINE HYDROCHLORIDE 1 DROP: 25 SOLUTION/ DROPS OPHTHALMIC at 08:42

## 2018-10-09 RX ADMIN — KETOROLAC TROMETHAMINE 30 MG: 30 INJECTION, SOLUTION INTRAMUSCULAR; INTRAVENOUS at 12:02

## 2018-10-09 RX ADMIN — Medication 500 ML: at 10:43

## 2018-10-09 RX ADMIN — DEXAMETHASONE SODIUM PHOSPHATE 0.5 ML: 4 INJECTION, SOLUTION INTRA-ARTICULAR; INTRALESIONAL; INTRAMUSCULAR; INTRAVENOUS; SOFT TISSUE at 10:44

## 2018-10-09 RX ADMIN — CYCLOPENTOLATE HYDROCHLORIDE 1 DROP: 10 SOLUTION/ DROPS OPHTHALMIC at 08:35

## 2018-10-09 RX ADMIN — FENTANYL CITRATE 50 MCG: 50 INJECTION, SOLUTION INTRAMUSCULAR; INTRAVENOUS at 10:23

## 2018-10-09 RX ADMIN — GABAPENTIN 300 MG: 300 CAPSULE ORAL at 08:36

## 2018-10-09 RX ADMIN — BALANCED SALT SOLUTION 15 ML: 6.4; .75; .48; .3; 3.9; 1.7 SOLUTION OPHTHALMIC at 10:42

## 2018-10-09 RX ADMIN — PROPOFOL 200 MCG/KG/MIN: 10 INJECTION, EMULSION INTRAVENOUS at 10:18

## 2018-10-09 RX ADMIN — TROPICAMIDE 1 DROP: 10 SOLUTION/ DROPS OPHTHALMIC at 08:42

## 2018-10-09 RX ADMIN — PROPOFOL 200 MG: 10 INJECTION, EMULSION INTRAVENOUS at 10:20

## 2018-10-09 RX ADMIN — PHENYLEPHRINE HYDROCHLORIDE 1 DROP: 25 SOLUTION/ DROPS OPHTHALMIC at 08:54

## 2018-10-09 RX ADMIN — TROPICAMIDE 1 DROP: 10 SOLUTION/ DROPS OPHTHALMIC at 08:34

## 2018-10-09 RX ADMIN — CELECOXIB 200 MG: 200 CAPSULE ORAL at 08:36

## 2018-10-09 RX ADMIN — FENTANYL CITRATE 50 MCG: 50 INJECTION, SOLUTION INTRAMUSCULAR; INTRAVENOUS at 10:27

## 2018-10-09 RX ADMIN — Medication 0.5 ML: at 10:44

## 2018-10-09 RX ADMIN — DEXAMETHASONE SODIUM PHOSPHATE 4 MG: 4 INJECTION, SOLUTION INTRA-ARTICULAR; INTRALESIONAL; INTRAMUSCULAR; INTRAVENOUS; SOFT TISSUE at 10:25

## 2018-10-09 ASSESSMENT — LIFESTYLE VARIABLES: TOBACCO_USE: 1

## 2018-10-09 NOTE — ADDENDUM NOTE
Addendum  created 10/09/18 1325 by Jacques Norris APRN CRNA    Anesthesia [employer] edited

## 2018-10-09 NOTE — DISCHARGE INSTRUCTIONS
[facility]  [phone]  Post Operative Eye Surgery Instructions    MD Norma Bustos MD  Will I have pain?  Some discomfort is normal and expected following surgery. The first few days after surgery you may need to use prescription pain pills. Taking Tylenol (acetaminophen) regularly may help prevent pain.  Discomfort should gradually decrease and Tylenol should be sufficient to relieve pain. A foreign body sensation in the cornea of the eye is very common and caused by sutures placed at surgery. These sutures will go away in one to two weeks. If the pain worsens, you should call the doctor.  Do I need to wear an eye patch?  You do not need to wear an eye patch at home after the doctor has removed the patch on your first day after surgery. However, you may be more comfortable wearing a patch outside in the sun, when sleeping or napping, or in a pedro, windy environment.  How much drainage should I have?  You may expect a moderate amount of drainage for a week. Gradually the drainage should decrease. The lids can be cleaned with a clean washcloth and gentle soap or diluted baby shampoo. Wipe the eyelids gently from the nose outward. Some blood in the tears is a normal finding.  Will there be swelling?  Some swelling is normal for about a week or two after which it will gradually decrease. Applying a cool compress, using a clean washcloth for 5 - 10 minutes several times a day may reduce the swelling and make you more comfortable. People may have some swelling of both eyes, especially if face down positioning is required. The white part of the eye may appear very red or bloody for a week or two. This may get worse a few days after surgery. Though the bright red appearance can look frightening, it is a normal finding early after surgery and will resolve in a few weeks.  Will I need to use eye drops?  You will be using several different kinds of eye drops or ointment (salve) when you  leave the hospital. The directions will be on each bottle or tube. The medication with the red top will keep your eye dilated and may make your eye more sensitive to light. Wearing sunglasses may help. The other medication is a combination antibiotic/steroid to prevent infection and promote healing.  Occasionally a third drop is used to control the pressure in your eye. A new bottle of artificial tears or lubricant ointment may be used along with your prescription eye drops after surgery. You will be using drops from four to eight weeks. Bring all eye medications (drops. ointments, or pills) with you  to each visit.   Always wash your hands before putting in the eye drops. You may wish to have someone else help you. Pull down on the lower lid and squeeze one drop from the bottle being careful not to touch the dropper to your eye or eyelid. One drop is sufficient, but another may be used if the first did not go into the eye. It is often easier to put in the drops if you are reclining or lying down. Wait five (5) minutes after the first drop before using the second drop to allow the medications to absorb into the eye.  How long will it take for my vision to improve?  Your vision should gradually improve, but it may take up to six months to regain your best vision. Frequently, air or gas bubbles are injected into the eye at the time of surgery. This will blur your vision significantly at first. As the bubble becomes smaller it will cause a black line in your vision that moves as you move your head. As the bubble becomes smaller you may notice that it looks more like a bubble or that it will break up into several smaller bubbles. It will take from a few days to a few weeks for the bubble to dissolve and be replaced by clear fluid.  You may notice floaters or double vision after your surgery. These symptoms usually will decrease with time. If the double vision is bothersome patching the eye may help.  If you notice a  sudden worsening in your vision call your doctor.  Are there any physical restrictions after surgery?  If an air or gas bubble was placed in the eye during surgery, you will be asked to spend most of your time (both awake and during the night) with your head in a specific position, frequently face down. As the eye heals and the bubble dissolves there will be less of a need for you to stay in that specific position. You should avoid sleeping on your back until the bubble has totally dissolved and you have been given permission from your surgeon. You should not fly in an airplane or go to high altitudes in the mountains while there is a bubble in your eye. If you should require any other surgery, under general anesthesia, while you still have an air bubble in your eye, have your surgeon or anesthetist contact us prior to your surgery. Some anesthetic agents can make the bubble expand and seriously damage your eye.  Patients that have a gas/air bubble placed in their eye will have a green medical alert band on their wrist.  This band should not be removed until the doctor removes it for you or gives you permission to remove it.     Heavy lifting (greater than 50 pounds), swimming and contact sports should be avoided for about 3 to 4 weeks after surgery.  You may resume your usual sexual activities about one week after surgery.  When may I return to work or my normal activities?  Depending on the type or work, you may return to work within a few days. If your work involves physical activity or driving, you will need to restrict your activities and remain home longer.  You may watch television, look at magazines, or work puzzles. Reading may be uncomfortable for several days, but using the eyes will not cause any damage.  You may go outside as usual. If conditions are windy or pedro, wear an eye pad to avoid getting dust or dirt in the eye.  Can I travel?  You cannot fly in an airplane or drive into the mountains as long  as the air or gas bubble remains in your eye.    Are there any driving restrictions?  Someone will need to drive you home from the hospital. Generally driving can be resumed in several days if you have good vision in your other eye. If you do not feel comfortable driving, do not drive! Your depth perception will be decreased so you will want to try driving during the day in light traffic until you feel comfortable driving. You should restrict your driving while you are taking prescription pain pills as they also can affect your judgment.  When can I shower and wash my hair?  You may shower or bathe when you get home, but avoid getting water in your eye. You may want someone to help you shampoo your hair at first.  You may shave, brush your teeth, or comb your hair. Do not use make-up, mascara, or creams/lotions around your eye for several weeks.  When will I see the doctor again?  Generally, you will be seen the first day after surgery and again 1-2 weeks later. If you have not received a return appointment before leaving the hospital, you should call our office during the business hours to arrange an appointment. If you will be seeing your local doctor instead of us, you will need to call that office to set up an appointment.    If you have a green armband on, your physician will instruct you as to how long you will have to wear it at your post-operative follow-up appointment.  How do I reach a doctor if I have concerns?  One of our doctors is available by calling [facility] [phone]. Please try to call for routine questions and prescription refills during business hours.  You should call your doctor if:  You notice a sudden decrease in your vision.  Have severe pain or pain increases rather than subsiding.  You notice a new black curtain over your eye that is not the gas bubble. If you have any of these symptoms, you may need to be examined.    [facility]  Home Care Following Anesthesia  For 24 hours after surgery:  1. Get plenty of rest.  A responsible adult must stay with you for at least 24 hours after you leave the surgery center.  2. Do not drive or use heavy equipment.  If you have weakness or tingling, don't drive or use heavy equipment until this feeling goes away.   3. Do not drink alcohol.   4. Avoid strenuous or risky activities.  Ask for help when climbing stairs.  5. You may feel lightheaded.  IF so, sit for a few minutes before standing.  Have someone help you get up.   6. If you have nausea (feel sick to your stomach): Drink only clear liquids such as apple juice, ginger ale, broth or 7-Up.  Rest may also help.  Be sure to drink enough fluids.  Move to a regular diet as you feel able.   7. You may have a slight fever.  Call the doctor if your fever is over 100 F (37.7 C) (taken under the tongue) or lasts longer than 24 hours.  8. You may have a dry mouth, a sore throat, muscle aches or trouble sleeping. These should go away after 24 hours.  9. Do not make important or legal decisions.       Tips for taking pain medications  To get the best pain relief possible, remember these points:    Take pain medications as directed, before pain becomes severe.    Pain medication can upset your stomach: taking it with food may help.    Constipation is a common side effect of pain medication. Drink plenty of  fluids.    Eat foods high in fiber. Take a stool softener if recommended by your doctor or pharmacist.    Do not drink alcohol, drive or operate machinery while taking pain medications.    Ask about other ways to control pain, such as with heat, ice or relaxation.    Tylenol/Acetaminophen Consumption  To help encourage the safe use of acetaminophen, the makers of TYLENOL  have lowered the maximum daily dose for single-ingredient Extra Strength TYLENOL  (acetaminophen) products sold in the [place] from 8 pills per day (4,000 mg) to 6 pills per day (3,000  mg). The dosing interval has also changed from 2 pills every 4-6 hours to 2 pills every 6 hours.    If you feel your pain relief is insufficient, you may take Tylenol/Acetaminophen in addition to your narcotic pain medication.     Be careful not to exceed 3,000 mg of Tylenol/Acetaminophen in a 24 hour period from all sources.    If you are taking extra strength Tylenol/acetaminophen (500 mg), the maximum dose is 6 tablets in 24 hours.    If you are taking regular strength acetaminophen (325 mg), the maximum dose is 9 tablets in 24 hours.    Call a doctor for any of the followin. Signs of infection (fever, growing tenderness at the surgery site, a large amount of drainage or bleeding, severe pain, foul-smelling drainage, redness, swelling).  2. It has been over 8 to 10 hours since surgery and you are still not able to urinate (pass water).  3. Headache for over 24 hours.  Your doctor is:   Dr. Norma Aleman, Ophthalmology: [phone]               Or dial [phone] and ask for the resident on call for:  Ophthalmology  For emergency care, call the:  [facility]:  [phone] (TTY for hearing impaired: [phone])

## 2018-10-09 NOTE — ANESTHESIA POSTPROCEDURE EVALUATION
Patient: Shane Mills    Procedure(s):  Right 25g Parsplana Vitrectomy, Parsplana lensectomy, Silicone Oil Removal, Epiretinal Membrane Peel, Retinectomy, Retinal reattachment, Air Fluid Exchange, Silicone Oil Infusion, intravitreal antibiotic injection - Wound Class: I-Clean    Diagnosis:Retinal Detachment  Diagnosis Additional Information: No value filed.    Anesthesia Type:  General, LMA    Note:  Anesthesia Post Evaluation    Patient location during evaluation: PACU  Patient participation: Able to fully participate in evaluation  Level of consciousness: awake and alert  Pain management: adequate  Airway patency: patent  Cardiovascular status: acceptable  Respiratory status: acceptable  Hydration status: acceptable  PONV: none             Last vitals:  Vitals:    10/09/18 0816 10/09/18 1226 10/09/18 1230   BP: 117/72 (!) 115/97 125/88   Pulse: 70     Resp: 16 16 15   Temp: 36.9  C (98.4  F) 36.4  C (97.6  F)    SpO2: 97% 100% 100%         Electronically Signed By: Yovanny Peterson MD  October 9, 2018  12:35 PM

## 2018-10-09 NOTE — IP AVS SNAPSHOT
[facility]    [address] [place] [id number]    Phone:  [phone]    Fax:  [phone]                                       After Visit Summary   10/9/2018    Shane Milsl    MRN: [id number]           After Visit Summary Signature Page     I have received my discharge instructions, and my questions have been answered. I have discussed any challenges I see with this plan with the nurse or doctor.    ..........................................................................................................................................  Patient/Patient Representative Signature      ..........................................................................................................................................  Patient Representative Print Name and Relationship to Patient    ..................................................               ................................................  Date                                   Time    ..........................................................................................................................................  Reviewed by Signature/Title    ...................................................              ..............................................  Date                                               Time          22EPIC Rev 08/18

## 2018-10-09 NOTE — OP NOTE
Surgeon:    Norma Aleman M.D  Assistant surgeon:       Chele Madsen MD  Pre-operative diagnosis:  Recurrent retinal detachment RIGHT eye, history of ruptured globe right eye  Post-operative diagnosis: Same  Surgery:     RIGHT 25 gauge vitectomy  , silicone oil removal, pars plana lensectomy, membrane peel, retinectomy, perfluorocarbon, endolaser, silicone oil 1000ct  Complications:   none  Anesthesia:    General anesthesia care and peribulbar block   Blood loss:    Scant  Complications :   None    INDICATIONS:   Shane Mills is a 28 year old patient with diagnosis of Recurrent retinal detachment RIGHT eye, history of ruptured globe right eye, here for surgical repair.     DESCRIPTION OF THE PROCEDURE   The patient was brought into the OR where general anesthesia was given by the anesthesia department. A peribulbar block consistent of a 1:1 mixtures of 2% Lidocaine and 0.75 % of marcaine with epinephrine and wydase was administered. The eye was prepped and draped in the usual fashion for ophthalmic surgery, including the installation of one drop of povidone iodine.    Marks were made on the sclera inferotemporally, superotemporally, and superonasally 3.5 mm posterior to the limbus. Transscleral cannulas were inserted through the sclera using the trocars. The infusion cannula was connected inferotemporally and directly visualized to verify it was in the correct location.     The oil extraction system was used to removed the oil. A 30g needle was entered the eye to penetrate the lens capsule and hydrodissect the epinucleus. The lens was removed using vitrectomy. The capsular complex was removed using an ILM forceps and vitrectomy. The eye was entered using the edoilluminator and the vitrector. An inferior retinal detachment with PVR were noted. The superior retina was attached with overlying deposits suspicious for early infection vs inflammation. 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 Brilliant blue was placed in the eye to stain the membranes and internal limiting membrane. A maxgrip forceps was used to removed the membranes. The ILM was very adherent and was partially removed over the macula and mid-periphery. A 23g cannular was inserted supratemporally and a minor dust was used to removed more of the membranes and ILM. An inferior retinectomy was completed using diathermy and vitrectomy. PFO was placed in the eye and the retina was flattened.endolaser was applied to the edge of the retinectomy and superior retina. An air-fluid exchange was completed and PFO was removed. Silicone oil 1000ct was placed in the eye. Intravitreal injections of vancomycin and ceftazidime was completed.    The instruments were removed. The sclerotomies were closed using 6-0 plain sutures.  The eye pressure was inspected and was appropriate. Subconjunctival injections of ancef and dexamethasone were administered.  The lid speculum was removed. The eye was cleaned with wet and dry gauze. A drop of atropine and maxitrol ointment was placed in the eye. An eye patch and ojeda shield were taped over the eye.   The patient tolerated well the procedure and was discharge to the post-operative unit in stable conditions with no complications.     The surgery was assisted by Dr. Chele Madsen, because no qualified resident was available on the day of the surgery. Due to the delicate and complex nature of this surgery, Dr. Chele Madsen was required. Dr. Chele Madsen assisted with the vitrectomy. I was present for the entire surgery.

## 2018-10-09 NOTE — ANESTHESIA CARE TRANSFER NOTE
Patient: Shane Mills    Procedure(s):  Right 25g Parsplana Vitrectomy, Parsplana lensectomy, Silicone Oil Removal, Epiretinal Membrane Peel, Retinectomy, Retinal reattachment, Air Fluid Exchange, Silicone Oil Infusion, intravitreal antibiotic injection - Wound Class: I-Clean    Diagnosis: Retinal Detachment  Diagnosis Additional Information: No value filed.    Anesthesia Type:   General, LMA     Note:  Airway :Nasal Cannula  Patient transferred to:PACU  Comments: Uneventful transport to PACU; VSS; Report given to RN; Pt comfortable; IV patent: pt exchanging wellHandoff Report: Identifed the Patient, Identified the Reponsible Provider, Reviewed the pertinent medical history, Discussed the surgical course, Reviewed Intra-OP anesthesia mangement and issues during anesthesia, Set expectations for post-procedure period and Allowed opportunity for questions and acknowledgement of understanding      Vitals: (Last set prior to Anesthesia Care Transfer)    CRNA VITALS  10/9/2018 1159 - 10/9/2018 1229      10/9/2018             Resp Rate (set): 10                Electronically Signed By: ROBIN Beckett CRNA  October 9, 2018  12:29 PM

## 2018-10-09 NOTE — ANESTHESIA PREPROCEDURE EVALUATION
Anesthesia Evaluation     .             ROS/MED HX    ENT/Pulmonary:  - neg pulmonary ROS   (+)tobacco use, Current use , . .    Neurologic:  - neg neurologic ROS     Cardiovascular:  - neg cardiovascular ROS       METS/Exercise Tolerance:     Hematologic:  - neg hematologic  ROS       Musculoskeletal:  - neg musculoskeletal ROS       GI/Hepatic:  - neg GI/hepatic ROS       Renal/Genitourinary:  - ROS Renal section negative       Endo:  - neg endo ROS       Psychiatric:  - neg psychiatric ROS       Infectious Disease:  - neg infectious disease ROS       Malignancy:      - no malignancy   Other: Comment: Marijuana use   - neg other ROS                 Physical Exam  Normal systems: cardiovascular, pulmonary and dental    Airway   Mallampati: II  TM distance: >3 FB  Neck ROM: full    Dental     Cardiovascular       Pulmonary                         Anesthesia Plan      History & Physical Review  History and physical reviewed and following examination; no interval change.    ASA Status:  2 .    NPO Status:  > 8 hours    Plan for General and LMA with Intravenous induction. Maintenance will be Balanced.    PONV prophylaxis:  Ondansetron (or other 5HT-3) and Dexamethasone or Solumedrol  Plan:  GA (surgical request) with routine monitors    Yovanny Peterson MD      Postoperative Care  Postoperative pain management:  IV analgesics, Oral pain medications and Multi-modal analgesia.      Consents  Anesthetic plan, risks, benefits and alternatives discussed with:  Patient..                          .

## 2018-10-09 NOTE — IP AVS SNAPSHOT
MRN:[id number]                      After Visit Summary   10/9/2018    Shane Mills    MRN: [id number]           Thank you!     Thank you for choosing [facility] for your care. Our goal is always to provide you with excellent care. Hearing back from our patients is one way we can continue to improve our services. Please take a few minutes to complete the written survey that you may receive in the mail after you visit with us. Thank you!        Patient Information     Date Of Birth          1990        About your hospital stay     You were admitted on:  October 9, 2018 [facility]    You were discharged on:  October 9, 2018       Who to Call     For medical emergencies, please call 911.  For non-urgent questions about your medical care, please call your primary care provider or clinic, None  For questions related to your surgery, please call your surgery clinic        Attending Provider     Provider Norma Reddy MD Ophthalmology       Primary Care Provider Fax #    Physician No Ref-Primary [phone]      After Care Instructions     Activity       Avoid strenuous activities the next several days.                  Your next 10 appointments already scheduled     Oct 10, 2018  8:00 AM CDT   Post-Op with Norma Aleman MD   Eye Clinic ([facility])    [address]  [facility] [address]   [phone]              Further instructions from your care team       [facility]  [phone]  Post Operative Eye Surgery Instructions    MD Norma Bustos MD  Will I have pain?  Some discomfort is normal and expected following surgery. The first few days after surgery you may need to use prescription pain pills. Taking Tylenol (acetaminophen) regularly may help prevent pain.  Discomfort should gradually decrease and  Tylenol should be sufficient to relieve pain. A foreign body sensation in the cornea of the eye is very common and caused by sutures placed at surgery. These sutures will go away in one to two weeks. If the pain worsens, you should call the doctor.  Do I need to wear an eye patch?  You do not need to wear an eye patch at home after the doctor has removed the patch on your first day after surgery. However, you may be more comfortable wearing a patch outside in the sun, when sleeping or napping, or in a pedro, windy environment.  How much drainage should I have?  You may expect a moderate amount of drainage for a week. Gradually the drainage should decrease. The lids can be cleaned with a clean washcloth and gentle soap or diluted baby shampoo. Wipe the eyelids gently from the nose outward. Some blood in the tears is a normal finding.  Will there be swelling?  Some swelling is normal for about a week or two after which it will gradually decrease. Applying a cool compress, using a clean washcloth for 5 - 10 minutes several times a day may reduce the swelling and make you more comfortable. People may have some swelling of both eyes, especially if face down positioning is required. The white part of the eye may appear very red or bloody for a week or two. This may get worse a few days after surgery. Though the bright red appearance can look frightening, it is a normal finding early after surgery and will resolve in a few weeks.  Will I need to use eye drops?  You will be using several different kinds of eye drops or ointment (salve) when you leave the hospital. The directions will be on each bottle or tube. The medication with the red top will keep your eye dilated and may make your eye more sensitive to light. Wearing sunglasses may help. The other medication is a combination antibiotic/steroid to prevent infection and promote healing.  Occasionally a third drop is used to control the pressure in your eye. A new bottle  of artificial tears or lubricant ointment may be used along with your prescription eye drops after surgery. You will be using drops from four to eight weeks. Bring all eye medications (drops. ointments, or pills) with you  to each visit.   Always wash your hands before putting in the eye drops. You may wish to have someone else help you. Pull down on the lower lid and squeeze one drop from the bottle being careful not to touch the dropper to your eye or eyelid. One drop is sufficient, but another may be used if the first did not go into the eye. It is often easier to put in the drops if you are reclining or lying down. Wait five (5) minutes after the first drop before using the second drop to allow the medications to absorb into the eye.  How long will it take for my vision to improve?  Your vision should gradually improve, but it may take up to six months to regain your best vision. Frequently, air or gas bubbles are injected into the eye at the time of surgery. This will blur your vision significantly at first. As the bubble becomes smaller it will cause a black line in your vision that moves as you move your head. As the bubble becomes smaller you may notice that it looks more like a bubble or that it will break up into several smaller bubbles. It will take from a few days to a few weeks for the bubble to dissolve and be replaced by clear fluid.  You may notice floaters or double vision after your surgery. These symptoms usually will decrease with time. If the double vision is bothersome patching the eye may help.  If you notice a sudden worsening in your vision call your doctor.  Are there any physical restrictions after surgery?  If an air or gas bubble was placed in the eye during surgery, you will be asked to spend most of your time (both awake and during the night) with your head in a specific position, frequently face down. As the eye heals and the bubble dissolves there will be less of a need for you to  stay in that specific position. You should avoid sleeping on your back until the bubble has totally dissolved and you have been given permission from your surgeon. You should not fly in an airplane or go to high altitudes in the mountains while there is a bubble in your eye. If you should require any other surgery, under general anesthesia, while you still have an air bubble in your eye, have your surgeon or anesthetist contact us prior to your surgery. Some anesthetic agents can make the bubble expand and seriously damage your eye.  Patients that have a gas/air bubble placed in their eye will have a green medical alert band on their wrist.  This band should not be removed until the doctor removes it for you or gives you permission to remove it.     Heavy lifting (greater than 50 pounds), swimming and contact sports should be avoided for about 3 to 4 weeks after surgery.  You may resume your usual sexual activities about one week after surgery.  When may I return to work or my normal activities?  Depending on the type or work, you may return to work within a few days. If your work involves physical activity or driving, you will need to restrict your activities and remain home longer.  You may watch television, look at magazines, or work puzzles. Reading may be uncomfortable for several days, but using the eyes will not cause any damage.  You may go outside as usual. If conditions are windy or pedro, wear an eye pad to avoid getting dust or dirt in the eye.  Can I travel?  You cannot fly in an airplane or drive into the mountains as long as the air or gas bubble remains in your eye.    Are there any driving restrictions?  Someone will need to drive you home from the hospital. Generally driving can be resumed in several days if you have good vision in your other eye. If you do not feel comfortable driving, do not drive! Your depth perception will be decreased so you will want to try driving during the day in light  traffic until you feel comfortable driving. You should restrict your driving while you are taking prescription pain pills as they also can affect your judgment.  When can I shower and wash my hair?  You may shower or bathe when you get home, but avoid getting water in your eye. You may want someone to help you shampoo your hair at first.  You may shave, brush your teeth, or comb your hair. Do not use make-up, mascara, or creams/lotions around your eye for several weeks.  When will I see the doctor again?  Generally, you will be seen the first day after surgery and again 1-2 weeks later. If you have not received a return appointment before leaving the hospital, you should call our office during the business hours to arrange an appointment. If you will be seeing your local doctor instead of us, you will need to call that office to set up an appointment.    If you have a green armband on, your physician will instruct you as to how long you will have to wear it at your post-operative follow-up appointment.  How do I reach a doctor if I have concerns?  One of our doctors is available by calling [facility] [phone]. Please try to call for routine questions and prescription refills during business hours.  You should call your doctor if:  You notice a sudden decrease in your vision.  Have severe pain or pain increases rather than subsiding.  You notice a new black curtain over your eye that is not the gas bubble. If you have any of these symptoms, you may need to be examined.    [facility]  Home Care Following Anesthesia  For 24 hours after surgery:  1. Get plenty of rest.  A responsible adult must stay with you for at least 24 hours after you leave the surgery center.  2. Do not drive or use heavy equipment.  If you have weakness or tingling, don't drive or use heavy equipment until this feeling goes away.   3. Do not drink alcohol.   4. Avoid strenuous or  risky activities.  Ask for help when climbing stairs.  5. You may feel lightheaded.  IF so, sit for a few minutes before standing.  Have someone help you get up.   6. If you have nausea (feel sick to your stomach): Drink only clear liquids such as apple juice, ginger ale, broth or 7-Up.  Rest may also help.  Be sure to drink enough fluids.  Move to a regular diet as you feel able.   7. You may have a slight fever.  Call the doctor if your fever is over 100 F (37.7 C) (taken under the tongue) or lasts longer than 24 hours.  8. You may have a dry mouth, a sore throat, muscle aches or trouble sleeping. These should go away after 24 hours.  9. Do not make important or legal decisions.       Tips for taking pain medications  To get the best pain relief possible, remember these points:    Take pain medications as directed, before pain becomes severe.    Pain medication can upset your stomach: taking it with food may help.    Constipation is a common side effect of pain medication. Drink plenty of  fluids.    Eat foods high in fiber. Take a stool softener if recommended by your doctor or pharmacist.    Do not drink alcohol, drive or operate machinery while taking pain medications.    Ask about other ways to control pain, such as with heat, ice or relaxation.    Tylenol/Acetaminophen Consumption  To help encourage the safe use of acetaminophen, the makers of TYLENOL  have lowered the maximum daily dose for single-ingredient Extra Strength TYLENOL  (acetaminophen) products sold in the [place] from 8 pills per day (4,000 mg) to 6 pills per day (3,000 mg). The dosing interval has also changed from 2 pills every 4-6 hours to 2 pills every 6 hours.    If you feel your pain relief is insufficient, you may take Tylenol/Acetaminophen in addition to your narcotic pain medication.     Be careful not to exceed 3,000 mg of Tylenol/Acetaminophen in a 24 hour period from all sources.    If you are taking extra strength Tylenol/acetaminophen  "(500 mg), the maximum dose is 6 tablets in 24 hours.    If you are taking regular strength acetaminophen (325 mg), the maximum dose is 9 tablets in 24 hours.    Call a doctor for any of the followin. Signs of infection (fever, growing tenderness at the surgery site, a large amount of drainage or bleeding, severe pain, foul-smelling drainage, redness, swelling).  2. It has been over 8 to 10 hours since surgery and you are still not able to urinate (pass water).  3. Headache for over 24 hours.  Your doctor is:   Dr. Norma Aleman, Ophthalmology: [phone]               Or dial [phone] and ask for the resident on call for:  Ophthalmology  For emergency care, call the:  [facility] [place] [facility]:  [phone] (TTY for hearing impaired: [phone])    Pending Results     No orders found from 10/7/2018 to 10/10/2018.            Admission Information     Date & Time Provider Department Dept. Phone    10/9/2018 Norma Aleman MD [facility] [phone]      Your Vitals Were     Blood Pressure Pulse Temperature Respirations Height Weight    103/62 70 97.6  F (36.4  C) (Temporal) 16 1.854 m (6' 1\") 89.8 kg (198 lb)    Pulse Oximetry BMI (Body Mass Index)                99% 26.12 kg/m2          Care EveryWhere ID     This is your Care EveryWhere ID. This could be used by other organizations to access your [facility] medical records  [id number]        Equal Access to Services     PETE OLIVAS AH: Hadii aad ku hadasho Soomaali, waaxda luqadaha, qaybta kaalmada adeegyada, waxay iona randolph . So [facility] [phone].    ATENCIÓN: Si habla español, tiene a stacy disposición servicios gratuitos de asistencia lingüística. Llame al [phone].    We comply with applicable federal civil rights laws and [place] laws. We do not discriminate on the basis of race, color, national origin, age, disability, sex, sexual orientation, or gender identity.             "   Review of your medicines      CONTINUE these medicines which may have CHANGED, or have new prescriptions. If we are uncertain of the size of tablets/capsules you have at home, strength may be listed as something that might have changed.        Dose / Directions    * neomycin-polymixin-dexamethasone ophthalmic suspension   Commonly known as:  MAXITROL   This may have changed:  Another medication with the same name was added. Make sure you understand how and when to take each.   Used for:  Right eye injury, subsequent encounter        Dose:  1 drop   Apply 1 drop to eye 4 times daily Instill into operative eye(s) per physician instructions.   Quantity:  5 mL   Refills:  0       * neomycin-polymyxin-dexamethasone 3.5-01308-5.1 Susp ophthalmic susp   Commonly known as:  MAXITROL   This may have changed:  Another medication with the same name was added. Make sure you understand how and when to take each.   Used for:  Ruptured globe of left eye, subsequent encounter        Dose:  1-2 drop   Place 1-2 drops into the right eye 4 times daily   Quantity:  1 Bottle   Refills:  2       * neomycin-polymixin-dexamethasone ophthalmic suspension   Commonly known as:  MAXITROL   This may have changed:  You were already taking a medication with the same name, and this prescription was added. Make sure you understand how and when to take each.   Used for:  Right retinal detachment        Dose:  1 drop   Apply 1 drop to eye 4 times daily Instill into operative eye(s) per physician instructions.   Quantity:  5 mL   Refills:  0       * Notice:  This list has 3 medication(s) that are the same as other medications prescribed for you. Read the directions carefully, and ask your doctor or other care provider to review them with you.      CONTINUE these medicines which have NOT CHANGED        Dose / Directions    atropine 1 % ophthalmic solution   Used for:  Ruptured globe of left eye, subsequent encounter        Dose:  1-2 drop   Place 1-2  drops into the right eye daily   Quantity:  1 Bottle   Refills:  1       erythromycin ophthalmic ointment   Commonly known as:  ROMYCIN   Used for:  Postoperative eye state        Dose:  0.5 inch   Place 0.5 inches into the right eye 3 times daily   Quantity:  1 Tube   Refills:  1       IBUPROFEN PO        Refills:  0       moxifloxacin 0.5 % ophthalmic solution   Commonly known as:  VIGAMOX   Used for:  Ruptured globe of left eye, subsequent encounter        Dose:  1 drop   Place 1 drop into the right eye 4 times daily   Quantity:  1 Bottle   Refills:  3       Multi-vitamin Tabs tablet        Dose:  1 tablet   Take 1 tablet by mouth daily   Refills:  0       oxyCODONE-acetaminophen 5-325 MG per tablet   Commonly known as:  PERCOCET   Used for:  Ruptured globe of left eye, subsequent encounter        Dose:  1 tablet   Take 1 tablet by mouth every 6 hours as needed for pain   Quantity:  20 tablet   Refills:  0       prednisoLONE acetate 1 % ophthalmic susp   Commonly known as:  PRED FORTE   Used for:  Ruptured globe of left eye, subsequent encounter        Dose:  1-2 drop   Place 1-2 drops into the right eye 4 times daily   Quantity:  1 Bottle   Refills:  3            Where to get your medicines      These medications were sent to [address] [address]    Hours:  TRANSPLANT PHONE NUMBER [phone] Phone:  [phone]     neomycin-polymixin-dexamethasone ophthalmic suspension                Protect others around you: Learn how to safely use, store and throw away your medicines at www.disposemymeds.org.             Medication List: This is a list of all your medications and when to take them. Check marks below indicate your daily home schedule. Keep this list as a reference.      Medications           Morning Afternoon Evening Bedtime As Needed    atropine 1 % ophthalmic solution   Place 1-2 drops into the  right eye daily   Last time this was given:  1 drop on 10/9/2018 10:40 AM                                erythromycin ophthalmic ointment   Commonly known as:  ROMYCIN   Place 0.5 inches into the right eye 3 times daily                                IBUPROFEN PO                                moxifloxacin 0.5 % ophthalmic solution   Commonly known as:  VIGAMOX   Place 1 drop into the right eye 4 times daily                                Multi-vitamin Tabs tablet   Take 1 tablet by mouth daily                                * neomycin-polymixin-dexamethasone ophthalmic suspension   Commonly known as:  MAXITROL   Apply 1 drop to eye 4 times daily Instill into operative eye(s) per physician instructions.                                * neomycin-polymyxin-dexamethasone 3.5-62938-6.1 Susp ophthalmic susp   Commonly known as:  MAXITROL   Place 1-2 drops into the right eye 4 times daily                                * neomycin-polymixin-dexamethasone ophthalmic suspension   Commonly known as:  MAXITROL   Apply 1 drop to eye 4 times daily Instill into operative eye(s) per physician instructions.                                oxyCODONE-acetaminophen 5-325 MG per tablet   Commonly known as:  PERCOCET   Take 1 tablet by mouth every 6 hours as needed for pain                                prednisoLONE acetate 1 % ophthalmic susp   Commonly known as:  PRED FORTE   Place 1-2 drops into the right eye 4 times daily                                * Notice:  This list has 3 medication(s) that are the same as other medications prescribed for you. Read the directions carefully, and ask your doctor or other care provider to review them with you.

## 2018-10-10 ENCOUNTER — OFFICE VISIT (OUTPATIENT)
Dept: OPHTHALMOLOGY | Facility: CLINIC | Age: 28
End: 2018-10-10
Attending: OPHTHALMOLOGY
Payer: OTHER MISCELLANEOUS

## 2018-10-10 DIAGNOSIS — Z48.810 AFTERCARE FOLLOWING SURGERY OF A SENSORY ORGAN: ICD-10-CM

## 2018-10-10 DIAGNOSIS — Z98.890 S/P EYE SURGERY: Primary | ICD-10-CM

## 2018-10-10 PROCEDURE — G0463 HOSPITAL OUTPT CLINIC VISIT: HCPCS | Mod: ZF

## 2018-10-10 ASSESSMENT — TONOMETRY
OD_IOP_MMHG: 22
IOP_METHOD: ICARE
OS_IOP_MMHG: 17

## 2018-10-10 ASSESSMENT — VISUAL ACUITY
METHOD: SNELLEN - LINEAR
OS_SC: 20/15
OS_SC+: -2
OD_SC: HM

## 2018-10-10 ASSESSMENT — CONF VISUAL FIELD
OD_SUPERIOR_TEMPORAL_RESTRICTION: 1
OD_INFERIOR_NASAL_RESTRICTION: 1
METHOD: COUNTING FINGERS
OD_SUPERIOR_NASAL_RESTRICTION: 1
OD_INFERIOR_TEMPORAL_RESTRICTION: 1
OS_NORMAL: 1

## 2018-10-10 NOTE — NURSING NOTE
"Chief Complaints and History of Present Illnesses   Patient presents with     Post Op (Ophthalmology) Right Eye     POD#1 s/p Vit     HPI    Affected eye(s):  Right   Symptoms:     Blurred vision   Decreased vision   No floaters   Flashes   Redness   No itching   No burning   Photophobia   No eye discharge      Duration:  1 day   Frequency:  Constant       Do you have eye pain now?:  Yes   Location:  OD   Pain Level:  Severe Pain (6), Severe Pain (7)   Pain Duration:  1 day   Pain Frequency:  Constant   Pain Characteristics:  Aching      Comments:  Wore ojeda shield after sx until appt today. Denied any trouble sleeping nor intense pain but \"does have a constant ache.\" Took an oral pain reliever which helped.     Since sx, has noted \"something vijay like a flash/shooting star\" in the RE. Appears in the central vision that lasts for a sec.    Ocular meds: none, will start after appt today.  Julieta Henao COT 8:30 AM October 10, 2018                "

## 2018-10-10 NOTE — MR AVS SNAPSHOT
After Visit Summary   10/10/2018    Shane Mills    MRN: [id number]           Patient Information     Date Of Birth          1990        Visit Information        Provider Department      10/10/2018 8:00 AM Norma Aleman MD [facility]        Today's Diagnoses     S/P eye surgery    -  1       Follow-ups after your visit        Your next 10 appointments already scheduled     Oct 17, 2018 11:15 AM CDT   RETURN RETINA with Norma Aleman MD   Eye Clinic ([facility])    [address] [id number] [phone]              Who to contact     Please call your clinic at [phone] to:    Ask questions about your health    Make or cancel appointments    Discuss your medicines    Learn about your test results    Speak to your doctor            Additional Information About Your Visit        Care EveryWhere ID     This is your Care EveryWhere ID. This could be used by other organizations to access your [place] medical records  [id number]         Blood Pressure from Last 3 Encounters:   10/09/18 105/62   09/04/18 128/78   08/23/18 133/75    Weight from Last 3 Encounters:   10/09/18 89.8 kg (198 lb)   09/04/18 93 kg (205 lb)   08/22/18 92.5 kg (203 lb 14.8 oz)              We Performed the Following     Ultrasound B-scan OD (right eye)        Primary Care Provider Fax #    Physician No Ref-Primary [phone]       No address on file        Equal Access to Services     SIVAN OLIVAS : Hadii aad ku hadasho Soomaali, waaxda luqadaha, qaybta kaalmada adeegyada, duc monson haycee randolph . So [facility] [phone].    ATENCIÓN: Si habla español, tiene a stacy disposición servicios gratuitos de asistencia lingüística. Llame al [phone].    We comply with applicable federal civil rights laws and [place] laws. We do not discriminate on the basis of race, color, national origin, age, disability, sex,  sexual orientation, or gender identity.            Thank you!     Thank you for choosing EYE CLINIC  for your care. Our goal is always to provide you with excellent care. Hearing back from our patients is one way we can continue to improve our services. Please take a few minutes to complete the written survey that you may receive in the mail after your visit with us. Thank you!             Your Updated Medication List - Protect others around you: Learn how to safely use, store and throw away your medicines at www.disposemymeds.org.          This list is accurate as of 10/10/18  9:09 AM.  Always use your most recent med list.                   Brand Name Dispense Instructions for use Diagnosis    atropine 1 % ophthalmic solution     1 Bottle    Place 1-2 drops into the right eye daily    Ruptured globe of left eye, subsequent encounter       erythromycin ophthalmic ointment    ROMYCIN    1 Tube    Place 0.5 inches into the right eye 3 times daily    Postoperative eye state       IBUPROFEN PO           moxifloxacin 0.5 % ophthalmic solution    VIGAMOX    1 Bottle    Place 1 drop into the right eye 4 times daily    Ruptured globe of left eye, subsequent encounter       Multi-vitamin Tabs tablet      Take 1 tablet by mouth daily        * neomycin-polymixin-dexamethasone ophthalmic suspension    MAXITROL    5 mL    Apply 1 drop to eye 4 times daily Instill into operative eye(s) per physician instructions.    Right eye injury, subsequent encounter       * neomycin-polymyxin-dexamethasone 3.5-00358-7.1 Susp ophthalmic susp    MAXITROL    1 Bottle    Place 1-2 drops into the right eye 4 times daily    Ruptured globe of left eye, subsequent encounter       * neomycin-polymixin-dexamethasone ophthalmic suspension    MAXITROL    5 mL    Apply 1 drop to eye 4 times daily Instill into operative eye(s) per physician instructions.    Right retinal detachment       oxyCODONE-acetaminophen 5-325 MG per tablet    PERCOCET    20  tablet    Take 1 tablet by mouth every 6 hours as needed for pain    Ruptured globe of left eye, subsequent encounter       prednisoLONE acetate 1 % ophthalmic susp    PRED FORTE    1 Bottle    Place 1-2 drops into the right eye 4 times daily    Ruptured globe of left eye, subsequent encounter       * Notice:  This list has 3 medication(s) that are the same as other medications prescribed for you. Read the directions carefully, and ask your doctor or other care provider to review them with you.

## 2018-10-10 NOTE — PROGRESS NOTES
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    Slept well  Retina attached  Doing well    Plan:  Position: straight or L side down  No aviation  No heavy lifting   Benoit shield at all times  Retina detachment and endophthalmitis precautions were discussed with the patient and was asked to return if any of the those occur    Medications to operative eye  Maxitrol (pink top) four times a day    Maxitrol oint at bedtime  Atropine (red top) once a day     Follow up in one week with ultrasound     Chele Madsen MD, PhD  Vitreoretinal Surgery Fellow    ~~~~~~~~~~~~~~~~~~~~~~~~~~~~~~~~~~   Complete documentation of historical and exam elements from today's encounter can be found in the full encounter summary report (not reduplicated in this progress note).  I personally obtained the chief complaint(s) and history of present illness.  I confirmed and edited as necessary the review of systems, past medical/surgical history, family history, social history, and examination findings as documented by others; and I examined the patient myself.  I personally reviewed the relevant tests, images, and reports as documented above.  I formulated and edited as necessary the assessment and plan and discussed the findings and management plan with the patient and family    Norma Aleman MD  .  Retina Service   Department of Ophthalmology and Visual Neurosciences   [facility]  Phone: [phone]   Fax: [phone]

## 2018-10-23 ENCOUNTER — TELEPHONE (OUTPATIENT)
Dept: OPHTHALMOLOGY | Facility: CLINIC | Age: 28
End: 2018-10-23

## 2018-10-23 NOTE — TELEPHONE ENCOUNTER
Pt called to say he is having ride issues, so hard to come this week. He is still working on getting a ride for later this week or next.    He states he is doing well. No pain, no flashes and floaters,. Is using drops 4 x daily still. He thinks the pain he has had is from the injury itself and not the eye surgery so if he has any more pain he will talk with his MD's at home.   Joya Cardenas COT 3:16 PM October 23, 2018

## 2018-11-06 DIAGNOSIS — H33.051 RECENT RETINAL DETACHMENT OF RIGHT EYE, TOTAL/SUBTOTAL: Primary | ICD-10-CM

## 2018-11-21 ENCOUNTER — OFFICE VISIT (OUTPATIENT)
Dept: OPHTHALMOLOGY | Facility: CLINIC | Age: 28
End: 2018-11-21
Attending: OPHTHALMOLOGY
Payer: OTHER MISCELLANEOUS

## 2018-11-21 DIAGNOSIS — H33.051 RECENT RETINAL DETACHMENT OF RIGHT EYE, TOTAL/SUBTOTAL: ICD-10-CM

## 2018-11-21 DIAGNOSIS — Z98.890 S/P EYE SURGERY: Primary | ICD-10-CM

## 2018-11-21 PROCEDURE — 76512 OPH US DX B-SCAN: CPT | Mod: ZF | Performed by: OPHTHALMOLOGY

## 2018-11-21 PROCEDURE — G0463 HOSPITAL OUTPT CLINIC VISIT: HCPCS | Mod: ZF

## 2018-11-21 ASSESSMENT — CONF VISUAL FIELD
OD_SUPERIOR_TEMPORAL_RESTRICTION: 1
OS_NORMAL: 1
OD_INFERIOR_TEMPORAL_RESTRICTION: 3

## 2018-11-21 ASSESSMENT — VISUAL ACUITY
OS_SC: 20/15
OD_SC: HM
METHOD: SNELLEN - LINEAR

## 2018-11-21 ASSESSMENT — TONOMETRY
OD_IOP_MMHG: 16
OS_IOP_MMHG: 17
IOP_METHOD: ICARE

## 2018-11-21 NOTE — MR AVS SNAPSHOT
After Visit Summary   11/21/2018    Shane Mills    MRN: [id number]           Patient Information     Date Of Birth          1990        Visit Information        Provider Department      11/21/2018 11:15 AM Norma [facility]man MD [facility]        Today's Diagnoses     S/P eye surgery - Right Eye    -  1    Recent retinal detachment of right eye, total/subtotal - Right Eye           Follow-ups after your visit        Follow-up notes from your care team     Return in about 1 month (around 12/21/2018) for Follow Up.      Your next 10 appointments already scheduled     Dec 03, 2018 11:00 AM CST   NEW RETINA with Radha Damon MD   Eye Clinic ([facility])    [address]  [address] [id number] [phone]              Who to contact     Please call your clinic at [phone] to:    Ask questions about your health    Make or cancel appointments    Discuss your medicines    Learn about your test results    Speak to your doctor            Additional Information About Your Visit        Care EveryWhere ID     This is your Care EveryWhere ID. This could be used by other organizations to access your [place] medical records  [id number]         Blood Pressure from Last 3 Encounters:   10/09/18 105/62   09/04/18 128/78   08/23/18 133/75    Weight from Last 3 Encounters:   10/09/18 89.8 kg (198 lb)   09/04/18 93 kg (205 lb)   08/22/18 92.5 kg (203 lb 14.8 oz)              We Performed the Following     Ultrasound B-scan OD (right eye)        Primary Care Provider Fax #    Physician No Ref-Primary [phone]       No address on file        Equal Access to Services     [facility]: Hadii kristian Colon, waaxda luqadaha, qaybta kaalmaduc cottrell. [facility] [phone].    ATENCIÓN: Si habla español, tiene a stacy disposición servicios gratuitos de asistencia lingüística.  Krishan spencer [phone].    We comply with applicable federal civil rights laws and [place] laws. We do not discriminate on the basis of race, color, national origin, age, disability, sex, sexual orientation, or gender identity.            Thank you!     Thank you for choosing EYE CLINIC  for your care. Our goal is always to provide you with excellent care. Hearing back from our patients is one way we can continue to improve our services. Please take a few minutes to complete the written survey that you may receive in the mail after your visit with us. Thank you!             Your Updated Medication List - Protect others around you: Learn how to safely use, store and throw away your medicines at www.disposemymeds.org.          This list is accurate as of 11/21/18 11:23 PM.  Always use your most recent med list.                   Brand Name Dispense Instructions for use Diagnosis    atropine 1 % ophthalmic solution     1 Bottle    Place 1-2 drops into the right eye daily    Ruptured globe of left eye, subsequent encounter       erythromycin ophthalmic ointment    ROMYCIN    1 Tube    Place 0.5 inches into the right eye 3 times daily    Postoperative eye state       IBUPROFEN PO           moxifloxacin 0.5 % ophthalmic solution    VIGAMOX    1 Bottle    Place 1 drop into the right eye 4 times daily    Ruptured globe of left eye, subsequent encounter       Multi-vitamin Tabs tablet      Take 1 tablet by mouth daily        * neomycin-polymyxin-dexamethasone 3.5-24011-1.1 Susp ophthalmic susp    MAXITROL    1 Bottle    Place 1-2 drops into the right eye 4 times daily    Ruptured globe of left eye, subsequent encounter       * neomycin-polymixin-dexamethasone 0.1 % ophthalmic suspension    MAXITROL    5 mL    Apply 1 drop to eye 4 times daily Instill into operative eye(s) per physician instructions.    Right retinal detachment       oxyCODONE-acetaminophen 5-325 MG tablet    PERCOCET    20 tablet    Take 1 tablet by mouth  every 6 hours as needed for pain    Ruptured globe of left eye, subsequent encounter       prednisoLONE acetate 1 % ophthalmic susp    PRED FORTE    1 Bottle    Place 1-2 drops into the right eye 4 times daily    Ruptured globe of left eye, subsequent encounter       * Notice:  This list has 2 medication(s) that are the same as other medications prescribed for you. Read the directions carefully, and ask your doctor or other care provider to review them with you.

## 2018-11-21 NOTE — NURSING NOTE
"Chief Complaints and History of Present Illnesses   Patient presents with     Post Op (Ophthalmology) Right Eye     POW#6 s/p 25g PPV/ PPL/ Silicone Oil removal endolaser/ air fluid exchange/ membrane peel/  Silicone Oil placement      HPI    Affected eye(s):  Right   Symptoms:     Blurred vision   No decreased vision   No distorted vision   No floaters   No flashes      Duration:  6 weeks   Frequency:  Intermittent       Do you have eye pain now?:  No      Comments:  Vision is stable since LV.     Only concern is residual pain \"from initial trauma\" of the RE. Incident was 8/22/18. Pt given Rx for Percocet 325mg tx of pain relief after sx but \"hasn't helped and is a different kind of pain.\" Thus, Pt tried CPD which and \"just wants Dr Aleman to know.\"     Pt got 1wk sample of CBD (cannabinoids) pills from \"a friend\" and found the best relief he's had in past 3mo.     Ocular meds: Maxitrol qid OD, Maxitrol demarco qhs OD, Atropine qDay right eye  Julieta Henao COT 11:50 AM November 21, 2018                "

## 2018-12-03 ENCOUNTER — OFFICE VISIT (OUTPATIENT)
Dept: OPHTHALMOLOGY | Facility: CLINIC | Age: 28
End: 2018-12-03
Attending: OPHTHALMOLOGY
Payer: OTHER MISCELLANEOUS

## 2018-12-03 DIAGNOSIS — H33.051 RECENT RETINAL DETACHMENT OF RIGHT EYE, TOTAL/SUBTOTAL: Primary | ICD-10-CM

## 2018-12-03 PROCEDURE — G0463 HOSPITAL OUTPT CLINIC VISIT: HCPCS | Mod: ZF

## 2018-12-03 PROCEDURE — 92133 CPTRZD OPH DX IMG PST SGM ON: CPT | Mod: ZF | Performed by: OPHTHALMOLOGY

## 2018-12-03 PROCEDURE — 92134 CPTRZ OPH DX IMG PST SGM RTA: CPT | Mod: ZF | Performed by: OPHTHALMOLOGY

## 2018-12-03 PROCEDURE — 92250 FUNDUS PHOTOGRAPHY W/I&R: CPT | Mod: ZF | Performed by: OPHTHALMOLOGY

## 2018-12-03 PROCEDURE — 76512 OPH US DX B-SCAN: CPT | Mod: ZF | Performed by: OPHTHALMOLOGY

## 2018-12-03 RX ORDER — PREDNISOLONE ACETATE 10 MG/ML
1-2 SUSPENSION/ DROPS OPHTHALMIC 3 TIMES DAILY
Qty: 1 BOTTLE | Refills: 0 | Status: SHIPPED | OUTPATIENT
Start: 2018-12-03 | End: 2019-03-11

## 2018-12-03 ASSESSMENT — CUP TO DISC RATIO
OS_RATIO: 0.5
OD_RATIO: 0.5

## 2018-12-03 ASSESSMENT — CONF VISUAL FIELD
OD_SUPERIOR_NASAL_RESTRICTION: 1
OD_INFERIOR_TEMPORAL_RESTRICTION: 1
OD_SUPERIOR_TEMPORAL_RESTRICTION: 1

## 2018-12-03 ASSESSMENT — EXTERNAL EXAM - RIGHT EYE: OD_EXAM: NORMAL

## 2018-12-03 ASSESSMENT — EXTERNAL EXAM - LEFT EYE: OS_EXAM: NORMAL

## 2018-12-03 ASSESSMENT — TONOMETRY
OD_IOP_MMHG: 15
OS_IOP_MMHG: 12
IOP_METHOD: TONOPEN

## 2018-12-03 ASSESSMENT — VISUAL ACUITY
OD_SC: HM
OS_SC: 20/15
METHOD: SNELLEN - LINEAR

## 2018-12-03 ASSESSMENT — SLIT LAMP EXAM - LIDS
COMMENTS: NORMAL
COMMENTS: NORMAL

## 2018-12-03 NOTE — MR AVS SNAPSHOT
After Visit Summary   12/3/2018    Shane Mills    MRN: [id number]           Patient Information     Date Of Birth          1990        Visit Information        Provider Department      12/3/2018 11:00 AM Radha Damon MD [facility]        Today's Diagnoses     Recent retinal detachment of right eye, total/subtotal    -  1       Follow-ups after your visit        Who to contact     Please call your clinic at [phone] to:    Ask questions about your health    Make or cancel appointments    Discuss your medicines    Learn about your test results    Speak to your doctor            Additional Information About Your Visit        Care EveryWhere ID     This is your Care EveryWhere ID. This could be used by other organizations to access your [place] medical records  [id number]         Blood Pressure from Last 3 Encounters:   10/09/18 105/62   09/04/18 128/78   08/23/18 133/75    Weight from Last 3 Encounters:   10/09/18 89.8 kg (198 lb)   09/04/18 93 kg (205 lb)   08/22/18 92.5 kg (203 lb 14.8 oz)              We Performed the Following     Fundus Autofluorescence Image (FAF) OU (both eyes)     Fundus Photos OU (both eyes)     OCT Optic Nerve RNFL Spectralis OU (both eyes)     OCT Retina Spectralis OU (both eyes)     Ultrasound B-scan OD (right eye)          Today's Medication Changes          These changes are accurate as of 12/3/18  3:47 PM.  If you have any questions, ask your nurse or doctor.               These medicines have changed or have updated prescriptions.        Dose/Directions    * prednisoLONE acetate 1 % ophthalmic suspension   Commonly known as:  PRED FORTE   This may have changed:  Another medication with the same name was added. Make sure you understand how and when to take each.   Used for:  Ruptured globe of left eye, subsequent encounter        Dose:  1-2 drop   Place 1-2 drops into the right eye 4 times daily   Quantity:  1 Bottle   Refills:  3       *  prednisoLONE acetate 1 % ophthalmic suspension   Commonly known as:  PRED FORTE   This may have changed:  You were already taking a medication with the same name, and this prescription was added. Make sure you understand how and when to take each.   Used for:  Recent retinal detachment of right eye, total/subtotal        Dose:  1-2 drop   Place 1-2 drops into the right eye 3 times daily   Quantity:  1 Bottle   Refills:  0       * Notice:  This list has 2 medication(s) that are the same as other medications prescribed for you. Read the directions carefully, and ask your doctor or other care provider to review them with you.         Where to get your medicines      These medications were sent to [facility] [address] [facility] [place] [place] - [address]  [address] [facility] [place] [address]     Phone:  [phone]     prednisoLONE acetate 1 % ophthalmic suspension                Primary Care Provider Fax #    Physician No Ref-Primary [phone]       No address on file        Equal Access to Services     [facility] : Marbin Colon, waaxlara lusruthi, qaybashlee kaalmada amparo, duc randolph . So [facility] [phone].    ATENCIÓN: Si habla español, tiene a stacy disposición servicios gratuitos de asistencia lingüística. Luísame al [phone].    We comply with applicable federal civil rights laws and [place] laws. We do not discriminate on the basis of race, color, national origin, age, disability, sex, sexual orientation, or gender identity.            Thank you!     Thank you for choosing EYE CLINIC  for your care. Our goal is always to provide you with excellent care. Hearing back from our patients is one way we can continue to improve our services. Please take a few minutes to complete the written survey that you may receive in the mail after your visit with us. Thank you!             Your Updated Medication List - Protect others around you: Learn how to safely use,  store and throw away your medicines at www.disposemymeds.org.          This list is accurate as of 12/3/18  3:47 PM.  Always use your most recent med list.                   Brand Name Dispense Instructions for use Diagnosis    atropine 1 % ophthalmic solution     1 Bottle    Place 1-2 drops into the right eye daily    Ruptured globe of left eye, subsequent encounter       erythromycin 5 MG/GM ophthalmic ointment    ROMYCIN    1 Tube    Place 0.5 inches into the right eye 3 times daily    Postoperative eye state       IBUPROFEN PO           moxifloxacin 0.5 % ophthalmic solution    VIGAMOX    1 Bottle    Place 1 drop into the right eye 4 times daily    Ruptured globe of left eye, subsequent encounter       Multi-vitamin tablet      Take 1 tablet by mouth daily        * neomycin-polymyxin-dexamethasone 3.5-30281-3.1 Susp ophthalmic susp    MAXITROL    1 Bottle    Place 1-2 drops into the right eye 4 times daily    Ruptured globe of left eye, subsequent encounter       * neomycin-polymixin-dexamethasone 0.1 % ophthalmic suspension    MAXITROL    5 mL    Apply 1 drop to eye 4 times daily Instill into operative eye(s) per physician instructions.    Right retinal detachment       oxyCODONE-acetaminophen 5-325 MG tablet    PERCOCET    20 tablet    Take 1 tablet by mouth every 6 hours as needed for pain    Ruptured globe of left eye, subsequent encounter       * prednisoLONE acetate 1 % ophthalmic suspension    PRED FORTE    1 Bottle    Place 1-2 drops into the right eye 4 times daily    Ruptured globe of left eye, subsequent encounter       * prednisoLONE acetate 1 % ophthalmic suspension    PRED FORTE    1 Bottle    Place 1-2 drops into the right eye 3 times daily    Recent retinal detachment of right eye, total/subtotal       * Notice:  This list has 4 medication(s) that are the same as other medications prescribed for you. Read the directions carefully, and ask your doctor or other care provider to review them with  you.

## 2018-12-03 NOTE — PROGRESS NOTES
CC -   Ruptured globe OS    INTERVAL HISTORY - Initial visit. Pt is referred by Dr Aleman for a second opinion regarding further care.   Using maxitrol demarco at bedtime and gtts 4/day, AT 1/day  Gets pain/HA episodic, seems to be localized to occiput, neck, no radiation to arms, ?improves with face down but comes and goes very rapidly    HPI -   Shane Mills is a  28 year old year-old patient with history of OGR OD 8/23/18 after hit by baseball while sitting in dugout, patient is  in [place]  Multiple surgeries for RD repair after OGR, rupture found extending into macula per records      PAST OCULAR SURGERY  OGR OD  8/23/18  PPV/retinectomy/SO 1000cs OD 9/4/18 ()  PPV/SOR/SO/PPL/MS/SO 1000cs OD  10/9/18 ()      RETINAL IMAGING:    OCT 12/3/18  OD - severe SR scarring, SRF central not connected to periphery  OS - retina normal, PHF attached      ASSESSMENT & PLAN  1.  Recurrent RD OD   - after repair x2 after OGR   - new superior area of RD   - persistent central SRF     - very guarded prognosis, unclear if vision can improve with surgery   - d/w patient, explained macular & possible ON damage might prevent further improvement   - patient understands, wishes to attempt repair     - r/b/a d/w patient: vision loss, blindness, infection, bleeding   - retinal detachment, need for more surgeries, need for gas or oil bubble and bubble restrictions   - cataract, diplopia, refractive change   - persistent blurriness, distortion, or scotoma   - participation of fellow or resident        2.  H/o open globe OD after blunt trauma   - severe rupture through macula   - possible TON but nasal RNFL appears intact            return to clinic: for surgery with     ATTESTATION     Attending Attestation:     Complete documentation of historical and exam elements from today's encounter can be found in the full encounter summary report (not reduplicated in this progress note).  I personally obtained  the chief complaint(s) and history of present illness.  I confirmed and edited as necessary the review of systems, past medical/surgical history, family history, social history, and examination findings as documented by others; and I examined the patient myself.  I personally reviewed the relevant tests, images, and reports as documented above.  I formulated and edited as necessary the assessment and plan and discussed the findings and management plan with the patient and family    Radha Damon MD, PhD  , Vitreoretinal Surgery  Department of Ophthalmology  [facility]

## 2018-12-17 NOTE — PROGRESS NOTES
CC -   Ruptured globe OS    INTERVAL HISTORY - Initial visit. Pt is referred by Dr Aleman for a second opinion regarding further care.   Using maxitrol demarco at bedtime and gtts 4/day, AT 1/day  Gets pain/HA episodic, seems to be localized to occiput, neck, no radiation to arms, ?improves with face down but comes and goes very rapidly    HPI -   Shane Mills is a  28 year old year-old patient with history of OGR OD 8/23/18 after hit by baseball [place], patient is  in [place]  Multiple surgeries for RD repair after OGR, rupture found extending into macula per records      PAST OCULAR SURGERY  OGR OD  8/23/18  PPV/retinectomy/SO 1000cs OD 9/4/18 ()  PPV/SOR/SO/PPL/MS/SO 1000cs OD  10/9/18 ()      RETINAL IMAGING:    OCT 12/3/18  OD - severe SR scarring, SRF central not connected to periphery  OS - retina normal, PHF attached      ASSESSMENT & PLAN  1.  Recurrent RD OD   - after repair x2 after OGR   - new superior area of RD   - persistent central SRF     - very guarded prognosis, unclear if vision can improve with surgery   - d/w patient, explained macular & possible ON damage might prevent further improvement   - patient understands, wishes to attempt repair     - r/b/a d/w patient: vision loss, blindness, infection, bleeding   - retinal detachment, need for more surgeries, need for gas or oil bubble and bubble restrictions   - cataract, diplopia, refractive change   - persistent blurriness, distortion, or scotoma   - participation of fellow or resident        2.  H/o open globe OD after blunt trauma   - severe rupture through macula   - possible TON but nasal RNFL appears intact    3. Pt complains of neck and head pain. Suggest he be seen by his physician again and have additional scans done.             return to clinic: for surgery with     ATTESTATION     Attending Attestation:     Complete documentation of historical and exam elements from today's  encounter can be found in the full encounter summary report (not reduplicated in this progress note).  I personally obtained the chief complaint(s) and history of present illness.  I confirmed and edited as necessary the review of systems, past medical/surgical history, family history, social history, and examination findings as documented by others; and I examined the patient myself.  I personally reviewed the relevant tests, images, and reports as documented above.  I formulated and edited as necessary the assessment and plan and discussed the findings and management plan with the patient and family    Radha Damon MD, PhD  , Vitreoretinal Surgery  Department of Ophthalmology  [facility]

## 2019-02-07 DIAGNOSIS — H33.051 OLD TOTAL RETINAL DETACHMENT OF RIGHT EYE: Primary | ICD-10-CM

## 2019-03-06 RX ORDER — TOPIRAMATE 25 MG/1
25 TABLET, FILM COATED ORAL DAILY
COMMUNITY

## 2019-03-06 RX ORDER — CYCLOBENZAPRINE HCL 10 MG
10 TABLET ORAL 3 TIMES DAILY PRN
COMMUNITY

## 2019-03-08 ENCOUNTER — ANESTHESIA EVENT (OUTPATIENT)
Dept: SURGERY | Facility: AMBULATORY SURGERY CENTER | Age: 29
End: 2019-03-08

## 2019-03-11 ENCOUNTER — HOSPITAL ENCOUNTER (OUTPATIENT)
Facility: AMBULATORY SURGERY CENTER | Age: 29
End: 2019-03-11
Attending: OPHTHALMOLOGY

## 2019-03-11 ENCOUNTER — ANESTHESIA (OUTPATIENT)
Dept: SURGERY | Facility: AMBULATORY SURGERY CENTER | Age: 29
End: 2019-03-11

## 2019-03-11 VITALS
RESPIRATION RATE: 14 BRPM | TEMPERATURE: 97.6 F | OXYGEN SATURATION: 100 % | HEIGHT: 74 IN | BODY MASS INDEX: 24.13 KG/M2 | WEIGHT: 188 LBS | SYSTOLIC BLOOD PRESSURE: 107 MMHG | HEART RATE: 58 BPM | DIASTOLIC BLOOD PRESSURE: 56 MMHG

## 2019-03-11 DIAGNOSIS — S05.91XS RIGHT EYE INJURY, SEQUELA: Primary | ICD-10-CM

## 2019-03-11 RX ORDER — ONDANSETRON 4 MG/1
4 TABLET, ORALLY DISINTEGRATING ORAL EVERY 30 MIN PRN
Status: DISCONTINUED | OUTPATIENT
Start: 2019-03-11 | End: 2019-03-12 | Stop reason: HOSPADM

## 2019-03-11 RX ORDER — CYCLOPENTOLATE HYDROCHLORIDE 10 MG/ML
1 SOLUTION/ DROPS OPHTHALMIC
Status: COMPLETED | OUTPATIENT
Start: 2019-03-11 | End: 2019-03-11

## 2019-03-11 RX ORDER — TETRACAINE HYDROCHLORIDE 5 MG/ML
SOLUTION OPHTHALMIC PRN
Status: DISCONTINUED | OUTPATIENT
Start: 2019-03-11 | End: 2019-03-11 | Stop reason: HOSPADM

## 2019-03-11 RX ORDER — BALANCED SALT SOLUTION 6.4; .75; .48; .3; 3.9; 1.7 MG/ML; MG/ML; MG/ML; MG/ML; MG/ML; MG/ML
SOLUTION OPHTHALMIC PRN
Status: DISCONTINUED | OUTPATIENT
Start: 2019-03-11 | End: 2019-03-11 | Stop reason: HOSPADM

## 2019-03-11 RX ORDER — PROPARACAINE HYDROCHLORIDE 5 MG/ML
1 SOLUTION/ DROPS OPHTHALMIC ONCE
Status: COMPLETED | OUTPATIENT
Start: 2019-03-11 | End: 2019-03-11

## 2019-03-11 RX ORDER — DEXAMETHASONE SODIUM PHOSPHATE 4 MG/ML
INJECTION, SOLUTION INTRA-ARTICULAR; INTRALESIONAL; INTRAMUSCULAR; INTRAVENOUS; SOFT TISSUE PRN
Status: DISCONTINUED | OUTPATIENT
Start: 2019-03-11 | End: 2019-03-11

## 2019-03-11 RX ORDER — MEPERIDINE HYDROCHLORIDE 25 MG/ML
12.5 INJECTION INTRAMUSCULAR; INTRAVENOUS; SUBCUTANEOUS
Status: DISCONTINUED | OUTPATIENT
Start: 2019-03-11 | End: 2019-03-12 | Stop reason: HOSPADM

## 2019-03-11 RX ORDER — LIDOCAINE HYDROCHLORIDE 20 MG/ML
INJECTION, SOLUTION INFILTRATION; PERINEURAL PRN
Status: DISCONTINUED | OUTPATIENT
Start: 2019-03-11 | End: 2019-03-11

## 2019-03-11 RX ORDER — PROPOFOL 10 MG/ML
INJECTION, EMULSION INTRAVENOUS PRN
Status: DISCONTINUED | OUTPATIENT
Start: 2019-03-11 | End: 2019-03-11

## 2019-03-11 RX ORDER — ACETAMINOPHEN 325 MG/1
975 TABLET ORAL ONCE
Status: COMPLETED | OUTPATIENT
Start: 2019-03-11 | End: 2019-03-11

## 2019-03-11 RX ORDER — SODIUM CHLORIDE, SODIUM LACTATE, POTASSIUM CHLORIDE, CALCIUM CHLORIDE 600; 310; 30; 20 MG/100ML; MG/100ML; MG/100ML; MG/100ML
INJECTION, SOLUTION INTRAVENOUS CONTINUOUS
Status: DISCONTINUED | OUTPATIENT
Start: 2019-03-11 | End: 2019-03-12 | Stop reason: HOSPADM

## 2019-03-11 RX ORDER — PHENYLEPHRINE HYDROCHLORIDE 25 MG/ML
1 SOLUTION/ DROPS OPHTHALMIC
Status: COMPLETED | OUTPATIENT
Start: 2019-03-11 | End: 2019-03-11

## 2019-03-11 RX ORDER — DEXAMETHASONE SODIUM PHOSPHATE 4 MG/ML
INJECTION, SOLUTION INTRA-ARTICULAR; INTRALESIONAL; INTRAMUSCULAR; INTRAVENOUS; SOFT TISSUE PRN
Status: DISCONTINUED | OUTPATIENT
Start: 2019-03-11 | End: 2019-03-11 | Stop reason: HOSPADM

## 2019-03-11 RX ORDER — FENTANYL CITRATE 50 UG/ML
25-50 INJECTION, SOLUTION INTRAMUSCULAR; INTRAVENOUS
Status: DISCONTINUED | OUTPATIENT
Start: 2019-03-11 | End: 2019-03-12 | Stop reason: HOSPADM

## 2019-03-11 RX ORDER — SODIUM CHLORIDE, SODIUM LACTATE, POTASSIUM CHLORIDE, CALCIUM CHLORIDE 600; 310; 30; 20 MG/100ML; MG/100ML; MG/100ML; MG/100ML
INJECTION, SOLUTION INTRAVENOUS CONTINUOUS
Status: DISCONTINUED | OUTPATIENT
Start: 2019-03-11 | End: 2019-03-11 | Stop reason: HOSPADM

## 2019-03-11 RX ORDER — ONDANSETRON 2 MG/ML
INJECTION INTRAMUSCULAR; INTRAVENOUS PRN
Status: DISCONTINUED | OUTPATIENT
Start: 2019-03-11 | End: 2019-03-11

## 2019-03-11 RX ORDER — OXYCODONE HYDROCHLORIDE 5 MG/1
5 TABLET ORAL EVERY 4 HOURS PRN
Status: DISCONTINUED | OUTPATIENT
Start: 2019-03-11 | End: 2019-03-12 | Stop reason: HOSPADM

## 2019-03-11 RX ORDER — NALOXONE HYDROCHLORIDE 0.4 MG/ML
.1-.4 INJECTION, SOLUTION INTRAMUSCULAR; INTRAVENOUS; SUBCUTANEOUS
Status: DISCONTINUED | OUTPATIENT
Start: 2019-03-11 | End: 2019-03-12 | Stop reason: HOSPADM

## 2019-03-11 RX ORDER — OXYCODONE HYDROCHLORIDE 5 MG/1
5 TABLET ORAL EVERY 6 HOURS PRN
Qty: 10 TABLET | Refills: 0 | Status: SHIPPED | OUTPATIENT
Start: 2019-03-11 | End: 2019-03-14

## 2019-03-11 RX ORDER — GABAPENTIN 300 MG/1
300 CAPSULE ORAL ONCE
Status: COMPLETED | OUTPATIENT
Start: 2019-03-11 | End: 2019-03-11

## 2019-03-11 RX ORDER — TROPICAMIDE 10 MG/ML
1 SOLUTION/ DROPS OPHTHALMIC
Status: COMPLETED | OUTPATIENT
Start: 2019-03-11 | End: 2019-03-11

## 2019-03-11 RX ORDER — ONDANSETRON 2 MG/ML
4 INJECTION INTRAMUSCULAR; INTRAVENOUS EVERY 30 MIN PRN
Status: DISCONTINUED | OUTPATIENT
Start: 2019-03-11 | End: 2019-03-12 | Stop reason: HOSPADM

## 2019-03-11 RX ORDER — ONDANSETRON 4 MG/1
4 TABLET, FILM COATED ORAL EVERY 8 HOURS PRN
Qty: 12 TABLET | Refills: 0 | Status: SHIPPED | OUTPATIENT
Start: 2019-03-11

## 2019-03-11 RX ORDER — ATROPINE SULFATE 10 MG/ML
SOLUTION/ DROPS OPHTHALMIC PRN
Status: DISCONTINUED | OUTPATIENT
Start: 2019-03-11 | End: 2019-03-11 | Stop reason: HOSPADM

## 2019-03-11 RX ORDER — LIDOCAINE 40 MG/G
CREAM TOPICAL
Status: DISCONTINUED | OUTPATIENT
Start: 2019-03-11 | End: 2019-03-11 | Stop reason: HOSPADM

## 2019-03-11 RX ORDER — FENTANYL CITRATE 50 UG/ML
INJECTION, SOLUTION INTRAMUSCULAR; INTRAVENOUS PRN
Status: DISCONTINUED | OUTPATIENT
Start: 2019-03-11 | End: 2019-03-11

## 2019-03-11 RX ADMIN — TROPICAMIDE 1 DROP: 10 SOLUTION/ DROPS OPHTHALMIC at 06:19

## 2019-03-11 RX ADMIN — PROPARACAINE HYDROCHLORIDE 1 DROP: 5 SOLUTION/ DROPS OPHTHALMIC at 06:19

## 2019-03-11 RX ADMIN — CYCLOPENTOLATE HYDROCHLORIDE 1 DROP: 10 SOLUTION/ DROPS OPHTHALMIC at 06:24

## 2019-03-11 RX ADMIN — CYCLOPENTOLATE HYDROCHLORIDE 1 DROP: 10 SOLUTION/ DROPS OPHTHALMIC at 06:29

## 2019-03-11 RX ADMIN — PHENYLEPHRINE HYDROCHLORIDE 1 DROP: 25 SOLUTION/ DROPS OPHTHALMIC at 06:29

## 2019-03-11 RX ADMIN — FENTANYL CITRATE 50 MCG: 50 INJECTION, SOLUTION INTRAMUSCULAR; INTRAVENOUS at 07:52

## 2019-03-11 RX ADMIN — FENTANYL CITRATE 50 MCG: 50 INJECTION, SOLUTION INTRAMUSCULAR; INTRAVENOUS at 07:55

## 2019-03-11 RX ADMIN — SODIUM CHLORIDE, SODIUM LACTATE, POTASSIUM CHLORIDE, CALCIUM CHLORIDE: 600; 310; 30; 20 INJECTION, SOLUTION INTRAVENOUS at 06:22

## 2019-03-11 RX ADMIN — CYCLOPENTOLATE HYDROCHLORIDE 1 DROP: 10 SOLUTION/ DROPS OPHTHALMIC at 06:19

## 2019-03-11 RX ADMIN — GABAPENTIN 300 MG: 300 CAPSULE ORAL at 06:21

## 2019-03-11 RX ADMIN — DEXAMETHASONE SODIUM PHOSPHATE 4 MG: 4 INJECTION, SOLUTION INTRA-ARTICULAR; INTRALESIONAL; INTRAMUSCULAR; INTRAVENOUS; SOFT TISSUE at 07:57

## 2019-03-11 RX ADMIN — LIDOCAINE HYDROCHLORIDE 40 MG: 20 INJECTION, SOLUTION INFILTRATION; PERINEURAL at 07:30

## 2019-03-11 RX ADMIN — TROPICAMIDE 1 DROP: 10 SOLUTION/ DROPS OPHTHALMIC at 06:29

## 2019-03-11 RX ADMIN — ONDANSETRON 4 MG: 2 INJECTION INTRAMUSCULAR; INTRAVENOUS at 07:26

## 2019-03-11 RX ADMIN — PHENYLEPHRINE HYDROCHLORIDE 1 DROP: 25 SOLUTION/ DROPS OPHTHALMIC at 06:24

## 2019-03-11 RX ADMIN — TROPICAMIDE 1 DROP: 10 SOLUTION/ DROPS OPHTHALMIC at 06:24

## 2019-03-11 RX ADMIN — PROPOFOL 50 MG: 10 INJECTION, EMULSION INTRAVENOUS at 07:30

## 2019-03-11 RX ADMIN — PHENYLEPHRINE HYDROCHLORIDE 1 DROP: 25 SOLUTION/ DROPS OPHTHALMIC at 06:19

## 2019-03-11 RX ADMIN — ACETAMINOPHEN 975 MG: 325 TABLET ORAL at 06:21

## 2019-03-11 RX ADMIN — PROPOFOL 50 MG: 10 INJECTION, EMULSION INTRAVENOUS at 07:31

## 2019-03-11 ASSESSMENT — MIFFLIN-ST. JEOR: SCORE: 1879.57

## 2019-03-11 ASSESSMENT — LIFESTYLE VARIABLES: TOBACCO_USE: 1

## 2019-03-11 NOTE — ANESTHESIA PREPROCEDURE EVALUATION
Anesthesia Pre-Procedure Evaluation    Patient: Shane Mills   MRN:     [id number] Gender:   male   Age:    28 year old :      1990        Preoperative Diagnosis: Recurrent Retinal Detachment   Procedure(s):  Right Eye 25 Vitrectomy, Fluid Gas Exchange, Silicone Oil Removal, Subretinal Fluid Drainage, Endolaser, Possible Silicone Oil Placement     No past medical history on file.   Past Surgical History:   Procedure Laterality Date     EXAM UNDER ANESTHESIA EYE(S) Left 2018    Procedure: EXAM UNDER ANESTHESIA EYE(S);;  Surgeon: Edu Krishnamurthy MD;  Location: [facility]     REPAIR RUPTURED GLOBE Right 2018    Procedure: REPAIR RUPTURED GLOBE;  Right Globe Exploration and Repair,   Left Eye Exam Under Anesthesia ;  Surgeon: Edu Krishnamurthy MD;  Location: [facility]     VITRECTOMY PARSPLANA Right 10/09/2018     VITRECTOMY PARSPLANA WITH 25 GAUGE SYSTEM Right 10/9/2018    Procedure: VITRECTOMY PARSPLANA WITH 25 GAUGE SYSTEM;  Right 25g Parsplana Vitrectomy, Parsplana lensectomy, Silicone Oil Removal, Epiretinal Membrane Peel, Retinectomy, Retinal reattachment, Air Fluid Exchange, Silicone Oil Infusion, intravitreal antibiotic injection;  Surgeon: Norma Aleman MD;  [facility]: [facility]          Anesthesia Evaluation     . Pt has had prior anesthetic.     No history of anesthetic complications          ROS/MED HX    ENT/Pulmonary:  - neg pulmonary ROS   (+)tobacco use, Current use , . .    Neurologic:  - neg neurologic ROS     Cardiovascular:  - neg cardiovascular ROS   (+) ----. : . . . :. . No previous cardiac testing       METS/Exercise Tolerance:  >4 METS   Hematologic:  - neg hematologic  ROS       Musculoskeletal:  - neg musculoskeletal ROS       GI/Hepatic:  - neg GI/hepatic ROS       Renal/Genitourinary:  - ROS Renal section negative       Endo:  - neg endo ROS       Psychiatric:  - neg psychiatric ROS       Infectious Disease:  - neg infectious disease ROS       Malignancy:      - no malignancy  "  Other: Comment: Marijuana use   - neg other ROS                     PHYSICAL EXAM:   Mental Status/Neuro: A/A/O   Airway: Facies: Feasible  Mallampati: I  Mouth/Opening: Full  TM distance: > 6 cm  Neck ROM: Full   Respiratory: Auscultation: CTAB     Resp. Rate: Normal     Resp. Effort: Normal      CV: Rhythm: Regular  Rate: Age appropriate  Heart: Normal Sounds   Comments:      Dental: Normal                  No results found for: WBC, HGB, HCT, PLT, CRP, SED, NA, POTASSIUM, CHLORIDE, CO2, BUN, CR, GLC, KARINA, PHOS, MAG, ALBUMIN, PROTTOTAL, ALT, AST, GGT, ALKPHOS, BILITOTAL, BILIDIRECT, LIPASE, AMYLASE, JOSE, PTT, INR, FIBR, TSH, T4, T3, HCG, HCGS, CKTOTAL, CKMB, TROPN    Preop Vitals  BP Readings from Last 3 Encounters:   03/11/19 114/77   10/09/18 105/62   09/04/18 128/78    Pulse Readings from Last 3 Encounters:   03/11/19 94   10/09/18 70   09/04/18 65      Resp Readings from Last 3 Encounters:   03/11/19 18   10/09/18 16   09/04/18 14    SpO2 Readings from Last 3 Encounters:   03/11/19 99%   10/09/18 98%   09/04/18 100%      Temp Readings from Last 1 Encounters:   03/11/19 36.8  C (98.3  F) (Oral)    Ht Readings from Last 1 Encounters:   03/11/19 1.867 m (6' 1.5\")      Wt Readings from Last 1 Encounters:   03/11/19 85.3 kg (188 lb)    Estimated body mass index is 24.47 kg/m  as calculated from the following:    Height as of this encounter: 1.867 m (6' 1.5\").    Weight as of this encounter: 85.3 kg (188 lb).     LDA:  Peripheral IV 03/11/19 Left Hand (Active)   Site Assessment WDL 3/11/2019  6:36 AM   Line Status Infusing 3/11/2019  6:36 AM   Phlebitis Scale 0-->no symptoms 3/11/2019  6:36 AM   Infiltration Scale 0 3/11/2019  6:36 AM   Extravasation? No 3/11/2019  6:36 AM   Dressing Intervention New dressing  3/11/2019  6:36 AM   Number of days: 0            Assessment:   ASA SCORE: 2    NPO Status: > 6 hours since completed Solid Foods   Documentation: H&P complete; Preop Testing complete; Consents complete "   Proceeding: Proceed without further delay  Tobacco Use:  NO Active use of Tobacco/UNKNOWN Tobacco use status     Plan:   Anes. Type:  MAC   Pre-Induction: Midazolam IV   Induction:  IV (Standard); Propofol   Airway: Native Airway   Access/Monitoring: PIV   Maintenance: Propofol; IV   Emergence: Procedure Site   Logistics: Same Day Surgery     Postop Pain/Sedation Strategy:  Standard-Options: Opioids PRN     PONV Management:  Adult Risk Factors:, Non-Smoker, Postop Opioids  Prevention: Ondansetron; Dexamethasone     CONSENT: Direct conversation   Plan and risks discussed with: Patient   Blood Products: Consent Deferred (Minimal Blood Loss)                         Malcolm Cardenas DO

## 2019-03-11 NOTE — ANESTHESIA CARE TRANSFER NOTE
Patient: Shane Mills    Procedure(s):  Right Eye 25 gauge Parsplana Vitrectomy, Retinal Reattachment,  Silicone Oil Removal, Epiretinal membrane peel, Endolaser, Air/Fluid Exchange, Air/gas exchange, Infusion of 20% SF6 gas, Subretinal Fluid Drainage,    Diagnosis: Recurrent Retinal Detachment  Diagnosis Additional Information: No value filed.    Anesthesia Type:   General, LMA     Note:  Airway :Nasal Cannula  Patient transferred to:PACU  Comments: Uneventful transport to PACU   Report to MERI Cochran  Exchanging well; color natl  Pt responds appropriately to command  IV patent  Lips/teeth/dentition as preop status  Questions answered  /97  HR 82 nsr  TAT 97.6  RR 16  Sat 99%Handoff Report: Identifed the Patient, Identified the Reponsible Provider, Reviewed the pertinent medical history, Discussed the surgical course, Reviewed Intra-OP anesthesia mangement and issues during anesthesia, Set expectations for post-procedure period and Allowed opportunity for questions and acknowledgement of understanding      Vitals: (Last set prior to Anesthesia Care Transfer)    CRNA VITALS  3/11/2019 0824 - 3/11/2019 0901      3/11/2019             Pulse:  73    SpO2:  99 %    Resp Rate (observed):  15                Electronically Signed By: ROBIN ARRIOLA CRNA  March 11, 2019  9:01 AM

## 2019-03-11 NOTE — ANESTHESIA POSTPROCEDURE EVALUATION
Anesthesia POST Procedure Evaluation    Patient: Shane Mills   MRN:     [id number] Gender:   male   Age:    28 year old :      1990        Preoperative Diagnosis: Recurrent Retinal Detachment   Procedure(s):  Right Eye 25 gauge Parsplana Vitrectomy, Retinal Reattachment,  Silicone Oil Removal, Epiretinal Membrane Peel, Endolaser, Air/Fluid Exchange, Infusion of 20% SF6 gas, Subretinal Fluid Drainage   Postop Comments: No value filed.       Anesthesia Type:  MAC    Reportable Event: NO     PAIN: Uncomplicated   Sign Out status: Comfortable, Well controlled pain     PONV: No PONV   Sign Out status:  No Nausea or Vomiting     Neuro/Psych: Uneventful perioperative course   Sign Out Status: Preoperative baseline; Age appropriate mentation     Airway/Resp.: Uneventful perioperative course   Sign Out Status: Non labored breathing, age appropriate RR; Resp. Status within EXPECTED Parameters     CV: Uneventful perioperative course   Sign Out status: Appropriate BP and perfusion indices; Appropriate HR/Rhythm     Disposition:   Sign Out in:  PACU  Disposition:  Phase II; Home  Recovery Course: Uneventful  Follow-Up: Not required           Last Anesthesia Record Vitals:  CRNA VITALS  3/11/2019 0824 - 3/11/2019 0924      3/11/2019             Pulse:  73    SpO2:  99 %    Resp Rate (observed):  15          Last PACU/Preop Vitals:  Vitals:    19 0900 19 0905 19 0935   BP: (!) 129/97 122/90 107/56   Pulse: 80 57 58   Resp: 12 12 14   Temp: 36.4  C (97.6  F) 36.4  C (97.6  F) 36.4  C (97.6  F)   SpO2: 100% 100% 100%         Electronically Signed By: Malcolm Cardenas DO, 2019, 11:02 AM

## 2019-03-11 NOTE — DISCHARGE INSTRUCTIONS
[facility]  [phone]  Post Operative Eye Surgery Instructions    MD Norma Bustos MD  Will I have pain?  Some discomfort is normal and expected following surgery. The first few days after surgery you may need to use prescription pain pills. Taking Tylenol (acetaminophen) regularly may help prevent pain.  Discomfort should gradually decrease and Tylenol should be sufficient to relieve pain. A foreign body sensation in the cornea of the eye is very common and caused by sutures placed at surgery. These sutures will go away in one to two weeks. If the pain worsens, you should call the doctor.  Do I need to wear an eye patch?  You do not need to wear an eye patch at home after the doctor has removed the patch on your first day after surgery. However, you may be more comfortable wearing a patch outside in the sun, when sleeping or napping, or in a pedro, windy environment.  How much drainage should I have?  You may expect a moderate amount of drainage for a week. Gradually the drainage should decrease. The lids can be cleaned with a clean washcloth and gentle soap or diluted baby shampoo. Wipe the eyelids gently from the nose outward. Some blood in the tears is a normal finding.  Will there be swelling?  Some swelling is normal for about a week or two after which it will gradually decrease. Applying a cool compress, using a clean washcloth for 5 - 10 minutes several times a day may reduce the swelling and make you more comfortable. People may have some swelling of both eyes, especially if face down positioning is required. The white part of the eye may appear very red or bloody for a week or two. This may get worse a few days after surgery. Though the bright red appearance can look frightening, it is a normal finding early after surgery and will resolve in a few weeks.  Will I need to use eye drops?  You will be using several different kinds of eye drops or ointment (salve) when you  leave the hospital. The directions will be on each bottle or tube. The medication with the red top will keep your eye dilated and may make your eye more sensitive to light. Wearing sunglasses may help. The other medication is a combination antibiotic/steroid to prevent infection and promote healing.  Occasionally a third drop is used to control the pressure in your eye. A new bottle of artificial tears or lubricant ointment may be used along with your prescription eye drops after surgery. You will be using drops from four to eight weeks. Bring all eye medications (drops. ointments, or pills) with you  to each visit.   Always wash your hands before putting in the eye drops. You may wish to have someone else help you. Pull down on the lower lid and squeeze one drop from the bottle being careful not to touch the dropper to your eye or eyelid. One drop is sufficient, but another may be used if the first did not go into the eye. It is often easier to put in the drops if you are reclining or lying down. Wait five (5) minutes after the first drop before using the second drop to allow the medications to absorb into the eye.  How long will it take for my vision to improve?  Your vision should gradually improve, but it may take up to six months to regain your best vision. Frequently, air or gas bubbles are injected into the eye at the time of surgery. This will blur your vision significantly at first. As the bubble becomes smaller it will cause a black line in your vision that moves as you move your head. As the bubble becomes smaller you may notice that it looks more like a bubble or that it will break up into several smaller bubbles. It will take from a few days to a few weeks for the bubble to dissolve and be replaced by clear fluid.  You may notice floaters or double vision after your surgery. These symptoms usually will decrease with time. If the double vision is bothersome patching the eye may help.  If you notice a  sudden worsening in your vision call your doctor.  Are there any physical restrictions after surgery?  If an air or gas bubble was placed in the eye during surgery, you will be asked to spend most of your time (both awake and during the night) with your head in a specific position, frequently face down. As the eye heals and the bubble dissolves there will be less of a need for you to stay in that specific position. You should avoid sleeping on your back until the bubble has totally dissolved and you have been given permission from your surgeon. You should not fly in an airplane or go to high altitudes in the mountains while there is a bubble in your eye. If you should require any other surgery, under general anesthesia, while you still have an air bubble in your eye, have your surgeon or anesthetist contact us prior to your surgery. Some anesthetic agents can make the bubble expand and seriously damage your eye.  Patients that have a gas/air bubble placed in their eye will have a green medical alert band on their wrist.  This band should not be removed until the doctor removes it for you or gives you permission to remove it.     Heavy lifting (greater than 50 pounds), swimming and contact sports should be avoided for about 3 to 4 weeks after surgery.  You may resume your usual sexual activities about one week after surgery.  When may I return to work or my normal activities?  Depending on the type or work, you may return to work within a few days. If your work involves physical activity or driving, you will need to restrict your activities and remain home longer.  You may watch television, look at magazines, or work puzzles. Reading may be uncomfortable for several days, but using the eyes will not cause any damage.  You may go outside as usual. If conditions are windy or pedro, wear an eye pad to avoid getting dust or dirt in the eye.  Can I travel?  You cannot fly in an airplane or drive into the mountains as long  as the air or gas bubble remains in your eye.    Are there any driving restrictions?  Someone will need to drive you home from the hospital. Generally driving can be resumed in several days if you have good vision in your other eye. If you do not feel comfortable driving, do not drive! Your depth perception will be decreased so you will want to try driving during the day in light traffic until you feel comfortable driving. You should restrict your driving while you are taking prescription pain pills as they also can affect your judgment.  When can I shower and wash my hair?  You may shower or bathe when you get home, but avoid getting water in your eye. You may want someone to help you shampoo your hair at first.  You may shave, brush your teeth, or comb your hair. Do not use make-up, mascara, or creams/lotions around your eye for several weeks.  When will I see the doctor again?  Generally, you will be seen the first day after surgery and again 1-2 weeks later. If you have not received a return appointment before leaving the hospital, you should call our office during the business hours to arrange an appointment. If you will be seeing your local doctor instead of us, you will need to call that office to set up an appointment.    If you have a green armband on, your physician will instruct you as to how long you will have to wear it at your post-operative follow-up appointment.  How do I reach a doctor if I have concerns?  One of our doctors is available by calling [facility] [phone]. Please try to call for routine questions and prescription refills during business hours.  You should call your doctor if:  You notice a sudden decrease in your vision.  Have severe pain or pain increases rather than subsiding.  You notice a new black curtain over your eye that is not the gas bubble. If you have any of these symptoms, you may need to be examined.    [facility]  Home Care Following Anesthesia  For 24 hours after surgery:  1. Get plenty of rest.  A responsible adult must stay with you for at least 24 hours after you leave the surgery center.  2. Do not drive or use heavy equipment.  If you have weakness or tingling, don't drive or use heavy equipment until this feeling goes away.   3. Do not drink alcohol.   4. Avoid strenuous or risky activities.  Ask for help when climbing stairs.  5. You may feel lightheaded.  IF so, sit for a few minutes before standing.  Have someone help you get up.   6. If you have nausea (feel sick to your stomach): Drink only clear liquids such as apple juice, ginger ale, broth or 7-Up.  Rest may also help.  Be sure to drink enough fluids.  Move to a regular diet as you feel able.   7. You may have a slight fever.  Call the doctor if your fever is over 100 F (37.7 C) (taken under the tongue) or lasts longer than 24 hours.  8. You may have a dry mouth, a sore throat, muscle aches or trouble sleeping. These should go away after 24 hours.  9. Do not make important or legal decisions.     Tylenol/Acetaminophen Consumption  To help encourage the safe use of acetaminophen, the makers of TYLENOL  have lowered the maximum daily dose for single-ingredient Extra Strength TYLENOL  (acetaminophen) products sold in the [place] from 8 pills per day (4,000 mg) to 6 pills per day (3,000 mg). The dosing interval has also changed from 2 pills every 4-6 hours to 2 pills every 6 hours.    If you feel your pain relief is insufficient, you may take Tylenol/Acetaminophen in addition to your narcotic pain medication.     Be careful not to exceed 3,000 mg of Tylenol/Acetaminophen in a 24 hour period from all sources.    If you are taking extra strength Tylenol/acetaminophen (500 mg), the maximum dose is 6 tablets in 24 hours.    If you are taking regular strength acetaminophen (325 mg), the maximum dose is 9 tablets in 24 hours.  Last dose of Tylenol:  975 mg at 6:21  am.  Next dose at 12:21 pm, if needed.  Follow package instructions.    Call a doctor for any of the followin. Signs of infection (fever, growing tenderness at the surgery site, a large amount of drainage or bleeding, severe pain, foul-smelling drainage, redness, swelling).  2. It has been over 8 to 10 hours since surgery and you are still not able to urinate (pass water).  3. Headache for over 24 hours.  Your doctor is:       Dr. Norma Alemna, Ophthalmology: [phone]               Or dial [phone] and ask for the resident on call for:  Ophthalmology  For emergency care, call the:  [facility]:  [phone] (TTY for hearing impaired: [phone])

## 2019-03-11 NOTE — OP NOTE
Surgeon:    Norma Aleman M.D  Assistant surgeon:       Chele Madsen MD; Sade Fink MD  Pre-operative diagnosis:  Recurrent retinal detachment, silicone oil emulsification , history of ruptured globe right eye  Post-operative diagnosis: Same  Surgery:     25 gauge vitectomy, silicone oil removal, membrane peel, fluid-air exchange, SF6 20%  Complications:   none  Anesthesia:    General anesthesia and peribulbar block   Blood loss:    Scant  Complications :   None    INDICATIONS:   Shane Mills is a 28 year old patient with diagnosis of Recurrent retinal detachment, silicone oil emulsification , history of ruptured globe right eye, here for surgical repair.     DESCRIPTION OF THE PROCEDURE   The patient was brought into the OR where anesthesia was given by the anesthesia department. A peribulbar block consistent of a 1:1 mixtures of 2% Lidocaine and 0.75 % of marcaine with epinephrine and wydase was administered. The eye was prepped and draped in the usual fashion for ophthalmic surgery, including the installation of one drop of povidone iodine.    Marks were made on the sclera inferotemporally, superotemporally, and superonasally 3.5 mm posterior to the limbus. Transscleral cannulas were inserted through the sclera using the trocars. The infusion cannula was connected inferotemporally and directly visualized to verify it was in the correct location. The oil extraction system was inserted and silicone oil was removed. The vitrector handpiece and endoilluiminator were placed in the eye. Membrane peel was carried out inferior and nasal retina. Endolaser laser was applied to inferior and nasal retina. Multiple fluid-air exchanges were completed to remove residual silicone oil and subretinal fluid was removed. A final fluid-air exchange was completed followed by air gas exchange with insertion of 20% SF6.     The instruments were removed. The sclerotomies were closed using 6-0 plain sutures.  The eye  pressure was inspected and was appropriate. Subconjunctival injections of ancef and dexamethasone were administered.  The lid speculum was removed. The eye was cleaned with wet and dry gauze. A drop of atropine and maxitrol ointment was placed in the eye. An eye patch and ojeda shield were taped over the eye.   The patient tolerated well the procedure and was discharge to the post-operative unit in stable conditions with no complications.    The surgery was assisted by Dr. Chele Madsen. Due to the delicate and complex nature of this surgery, Dr. Chele Madsen was required. Dr. Chele Madsen assisted with vitrectomy. I was present for the entire surgery.

## 2019-03-12 ENCOUNTER — OFFICE VISIT (OUTPATIENT)
Dept: OPHTHALMOLOGY | Facility: CLINIC | Age: 29
End: 2019-03-12
Attending: OPHTHALMOLOGY
Payer: OTHER MISCELLANEOUS

## 2019-03-12 DIAGNOSIS — Z98.890 S/P EYE SURGERY: Primary | ICD-10-CM

## 2019-03-12 PROCEDURE — G0463 HOSPITAL OUTPT CLINIC VISIT: HCPCS | Mod: ZF

## 2019-03-12 RX ORDER — DORZOLAMIDE HYDROCHLORIDE AND TIMOLOL MALEATE 20; 5 MG/ML; MG/ML
1 SOLUTION/ DROPS OPHTHALMIC 2 TIMES DAILY
Qty: 1 BOTTLE | Refills: 0 | Status: SHIPPED | OUTPATIENT
Start: 2019-03-12

## 2019-03-12 RX ORDER — NEOMYCIN SULFATE, POLYMYXIN B SULFATE AND DEXAMETHASONE 3.5; 10000; 1 MG/ML; [USP'U]/ML; MG/ML
1-2 SUSPENSION/ DROPS OPHTHALMIC 4 TIMES DAILY
Qty: 1 BOTTLE | Refills: 0 | Status: SHIPPED | OUTPATIENT
Start: 2019-03-12

## 2019-03-12 ASSESSMENT — CONF VISUAL FIELD
OD_SUPERIOR_TEMPORAL_RESTRICTION: 1
OD_INFERIOR_TEMPORAL_RESTRICTION: 1
METHOD: COUNTING FINGERS
OD_SUPERIOR_NASAL_RESTRICTION: 1
OS_NORMAL: 1
OD_INFERIOR_NASAL_RESTRICTION: 1

## 2019-03-12 ASSESSMENT — EXTERNAL EXAM - LEFT EYE: OS_EXAM: NORMAL

## 2019-03-12 ASSESSMENT — CUP TO DISC RATIO: OD_RATIO: 0.5

## 2019-03-12 ASSESSMENT — VISUAL ACUITY
METHOD: SNELLEN - LINEAR
OS_SC: 20/20
OS_SC+: +2
OD_SC: LP

## 2019-03-12 ASSESSMENT — TONOMETRY
OS_IOP_MMHG: 11
OD_IOP_MMHG: 22
IOP_METHOD: ICARE

## 2019-03-12 ASSESSMENT — EXTERNAL EXAM - RIGHT EYE: OD_EXAM: NORMAL

## 2019-03-12 ASSESSMENT — SLIT LAMP EXAM - LIDS
COMMENTS: NORMAL
COMMENTS: NORMAL

## 2019-03-12 NOTE — NURSING NOTE
Chief Complaints and History of Present Illnesses   Patient presents with     Post Op (Ophthalmology) Right Eye     POD #1 s/p RE Vit     Chief Complaint(s) and History of Present Illness(es)     Post Op (Ophthalmology) Right Eye     Associated symptoms: Negative for flashes and floaters    Comments: POD #1 s/p RE Vit              Comments     Pt slept on and off last night. Achy feeling in RE yesterday, relief with pain medication.     Cameron CHIN March 12, 2019 7:49 AM

## 2019-03-12 NOTE — PATIENT INSTRUCTIONS
Face Down Positioning Chairs    [facility]   [address]   [place]  [address]   Phone: [phone]   Hours:   Monday - Friday 8:00 am - 4:30 pm    [facility]   [address], [address]   [place]  [address]   Phone: [phone]   Hours:  Monday - Friday 8:00 am - 4:30 pm    Delivery options available.  (call in advance for specific date requests)  Chair may be returned to either [facility] location listed above      [employer]  You can order either by phone or on the website.       Local Phone: [phone]   Toll Free: [phone]   [email or web address]      If you do not live in the [facility] area, please contact your primary clinic regarding durable medical equipment.      [facility]  [address] [place] [address]  [phone]

## 2019-03-12 NOTE — PROGRESS NOTES
Postoperative day 1 status post Right Eye 25 gauge Parsplana Vitrectomy, Retinal Reattachment,  Silicone Oil Removal, Epiretinal Membrane Peel, Endolaser, Air/Fluid Exchange, Infusion of 20% SF6 gas, Subretinal Fluid Drainage    Slept well  Chamber is shallow but IOP is acceptable  Retina attached  Doing well    Plan:  Position: face down until Monday then not on back  No aviation  No heavy lifting   Benoit shield at all times  Retina detachment and endophthalmitis precautions were discussed with the patient and was asked to return if any of the those occur    Medications to operative eye  Maxitrol (pink top) four times a day    Cosopt bid (Blue top)  Maxitrol oint at bedtime  Atropine (red top) once a day     Follow up in 2 days in [place] for IOP check , if pressure is good then recheck in 1 week (Next Monday)  Can stop cosopt at 1 week if IOP is very good  Maxitrol taper at 1 week if no infection  Stop maxitrol ointment and atropine at 1 week if no infection       Complete documentation of historical and exam elements from today's encounter can be found in the full encounter summary report (not reduplicated in this progress note).  I personally obtained the chief complaint(s) and history of present illness.  I confirmed and edited as necessary the review of systems, past medical/surgical history, family history, social history, and examination findings as documented by others; and I examined the patient myself.  I personally reviewed the relevant tests, images, and reports as documented above.  I personally reviewed the ophthalmic test(s) associated with this encounter, agree with the interpretation(s) as documented by the resident/fellow, and have edited the corresponding report(s) as necessary.   I formulated and edited as necessary the assessment and plan and discussed the findings and management plan with the patient and family       Chele Madsen MD PhD  Vitreoretinal Surgery Fellow  [facility]  [place]

## 2019-03-15 ENCOUNTER — TELEPHONE (OUTPATIENT)
Dept: OPHTHALMOLOGY | Facility: CLINIC | Age: 29
End: 2019-03-15

## 2019-03-15 NOTE — TELEPHONE ENCOUNTER
Pt will continue care post op with referring clinic/[place] pt in [place]-- Dr. Bonilla with Ophthalmology limited.  Pt last seen yesterday    Referring clinic requesting notes for continuity of care    Note to  to assist in notes/op notes per request    Attn: Dr. Bonilla--  [phone]

## 2019-04-03 ENCOUNTER — TRANSFERRED RECORDS (OUTPATIENT)
Dept: HEALTH INFORMATION MANAGEMENT | Facility: CLINIC | Age: 29
End: 2019-04-03

## 2019-04-03 DIAGNOSIS — H33.051 OLD TOTAL RETINAL DETACHMENT OF RIGHT EYE: Primary | ICD-10-CM

## 2019-04-08 ENCOUNTER — OFFICE VISIT (OUTPATIENT)
Dept: OPHTHALMOLOGY | Facility: CLINIC | Age: 29
End: 2019-04-08
Attending: OPHTHALMOLOGY
Payer: OTHER MISCELLANEOUS

## 2019-04-08 DIAGNOSIS — Z48.810 AFTERCARE FOLLOWING SURGERY OF A SENSORY ORGAN: Primary | ICD-10-CM

## 2019-04-08 PROCEDURE — 76512 OPH US DX B-SCAN: CPT | Mod: ZF,RT | Performed by: OPHTHALMOLOGY

## 2019-04-08 PROCEDURE — G0463 HOSPITAL OUTPT CLINIC VISIT: HCPCS | Mod: ZF

## 2019-04-08 ASSESSMENT — CONF VISUAL FIELD
OD_SUPERIOR_TEMPORAL_RESTRICTION: 1
METHOD: COUNTING FINGERS
OD_INFERIOR_TEMPORAL_RESTRICTION: 1
OD_SUPERIOR_NASAL_RESTRICTION: 1
OD_INFERIOR_NASAL_RESTRICTION: 1
OS_NORMAL: 1

## 2019-04-08 ASSESSMENT — SLIT LAMP EXAM - LIDS
COMMENTS: NORMAL
COMMENTS: NORMAL

## 2019-04-08 ASSESSMENT — TONOMETRY
IOP_METHOD: TONOPEN
OS_IOP_MMHG: 14
OD_IOP_MMHG: 25

## 2019-04-08 ASSESSMENT — EXTERNAL EXAM - RIGHT EYE: OD_EXAM: NORMAL

## 2019-04-08 ASSESSMENT — VISUAL ACUITY
OS_SC: 20/20
METHOD: SNELLEN - LINEAR

## 2019-04-08 ASSESSMENT — EXTERNAL EXAM - LEFT EYE: OS_EXAM: NORMAL

## 2019-04-08 ASSESSMENT — CUP TO DISC RATIO: OD_RATIO: 0.5

## 2019-04-08 NOTE — PROGRESS NOTES
status post Right Eye 25 gauge Parsplana Vitrectomy, Silicone Oil Removal, Epiretinal Membrane Peel, Endolaser, Air/Fluid Exchange, Infusion of 20% SF6 gas, Subretinal Fluid Drainage 3/11/19    Reports no improvement of vision   Now with recurrent sup detachment  Now eye pain    Ultrasound: low lying Retinal detachment ; Silicone Oil droplets noted. diffuse chorioretinal / scleral thickening nasally including in macula.    Long discussed with patient regarding need for further surgeries given poor visual potential because of  the initial injury with open globe extending to the posterior segment, through the macula.  Although could consider Pars plana vitrectomy (PPV) and Silicone Oil insertion, given his poor visual potential, the decision was to observe. Per patient the eye otherwise feels comfortable without  Pain.    Plan:    Retina detachment and endophthalmitis precautions were discussed with the patient and was asked to return if any of the those occur  Sympathetic ophthalmia symptoms discussed with patient   The patient was told to wear polycarbonte glasses at all times to protect the good eye.  he expressed understanding.      Cosopt once a day  (Blue top)  Stop Predforte  For now   artificial tears  As needed     Follow up in one month in [place] for IOP check   Will follow up at [facility] as needed     ~~~~~~~~~~~~~~~~~~~~~~~~~~~~~~~~~~   Complete documentation of historical and exam elements from today's encounter can be found in the full encounter summary report (not reduplicated in this progress note).  I personally obtained the chief complaint(s) and history of present illness.  I confirmed and edited as necessary the review of systems, past medical/surgical history, family history, social history, and examination findings as documented by others; and I examined the patient myself.  I personally reviewed the relevant tests, images, and reports as documented above.  I formulated and  edited as necessary the assessment and plan and discussed the findings and management plan with the patient and family    Norma Aleman MD   of Ophthalmology.  Retina Service   Department of Ophthalmology and Visual Neurosciences   [facility]  Phone: [phone]   Fax: [phone]

## 2019-04-08 NOTE — NURSING NOTE
Chief Complaints and History of Present Illnesses   Patient presents with     Post Op (Ophthalmology) Right Eye      Chief Complaint(s) and History of Present Illness(es)     Post Op (Ophthalmology) Right Eye     Laterality: right eye              Comments     1 month post op of Right Eye 25 gauge Parsplana Vitrectomy, Retinal Reattachment,  Silicone Oil Removal, Epiretinal Membrane Peel, Endolaser, Air/Fluid Exchange, Infusion of 20% SF6 gas, Subretinal Fluid Drainage.  4/3/19 was told that right eye retina has detached again.  No changes in vision right eye within the last month.  No changes in vision left eye.   Eye meds:   Maxitrol (pink top) four times a day   Carie Bruno CO 4/8/2019 12:11 PM

## 2019-05-15 ENCOUNTER — TRANSFERRED RECORDS (OUTPATIENT)
Dept: HEALTH INFORMATION MANAGEMENT | Facility: CLINIC | Age: 29
End: 2019-05-15

## 2019-06-13 NOTE — NURSING NOTE
Chief Complaints and History of Present Illnesses   Patient presents with     Consult For     blunt force trauma     HPI    Symptoms:     No floaters   No flashes   No redness   Tearing   Eye discharge         Do you have eye pain now?:  Yes   Location:  OD   Pain Level:  Severe Pain (7)   Pain Duration:  1 day   Pain Frequency:  Constant   Pain Characteristics:  Aching      Comments:  Patient presents for blunt force trauma to the RE by a baseball yesterday evening. Went to the ER last night, was given many eye drops and testing. Presents with a patch over RE today. Upon removal eye is swollen shut and possibly dilated by ER team. Pain in the eye today, some tearing or discharge also. The eye is patched so pt is unaware of the [facility] however the vision in the LE is fine. Shira RODNEY 9:04 AM August 22, 2018             Gave patient 2 325mg acetaminophen tablets in clinic for pain.  Gregg Hollingsworth @ [facility] 12:28 PM August 22, 2018      No

## (undated) DEVICE — Device

## (undated) DEVICE — ESU CORD BIPOLAR GREEN 10-4000

## (undated) DEVICE — NDL 18GA 1.5" 305196

## (undated) DEVICE — TAPE MICROPORE 2"X1.5YD 1530S-2

## (undated) DEVICE — EYE PERFLUORON KIT 5ML 8065900163

## (undated) DEVICE — EYE SPONGE SPEAR WECK CEL 0008685

## (undated) DEVICE — ESU HOLSTER PLASTIC DISP E2400

## (undated) DEVICE — EYE PACK CONSTELLATION VITRECTOMY TOTAL PLUS 25GA 8065751462

## (undated) DEVICE — TAPE MICROPORE 1"X1.5YD 1530S-1

## (undated) DEVICE — SU PLAIN 6-0 TG140-8 18" 1735G

## (undated) DEVICE — EYE PROBE LASER 25GA FLEX RFID 8065751114

## (undated) DEVICE — SYR 05ML SLIP TIP W/O NDL 309647

## (undated) DEVICE — APPLICATORS COTTON-TIPPED 3" PKG OF 2 C15050-003

## (undated) DEVICE — DRAPE MICRO 41X81"

## (undated) DEVICE — EYE PACK CONSTELLATION VFC 8065750957

## (undated) DEVICE — EYE TIP BIPOLAR 18GA ERASER 221250

## (undated) DEVICE — SYR 01ML 27GA 0.5" NDL TBC 309623

## (undated) DEVICE — PACK VITRECTOMY PQ15VI57E

## (undated) DEVICE — EYE SHIELD PLASTIC

## (undated) DEVICE — EYE SHIELD FOX  W/GARTER ADULT 202

## (undated) DEVICE — STRAP KNEE/BODY 31143004

## (undated) DEVICE — LINEN TOWEL PACK X5 5464

## (undated) DEVICE — EYE SOL BSS 500ML BAG 0065-1795-04

## (undated) DEVICE — GLOVE PROTEXIS W/NEU-THERA 7.5  2D73TE75

## (undated) DEVICE — EYE NDL RETROBULBAR 25GA 1.5" 581275

## (undated) DEVICE — GLOVE PROTEXIS MICRO 6.0  2D73PM60

## (undated) DEVICE — TUBING SUCTION 12"X1/4" N612

## (undated) DEVICE — EYE GAS SF6

## (undated) DEVICE — SYR 01ML 27GA 0.5" ECLIPSE 305789

## (undated) DEVICE — EYE PREP BETADINE 5% SOLUTION 30ML 0065-0411-30

## (undated) DEVICE — SU ETHILON 8-0 TG175-8 12" 1716G

## (undated) DEVICE — PEN MARKING SKIN W/PAPER RULER 31145785

## (undated) DEVICE — NDL 30GA 0.5" 305106

## (undated) DEVICE — EYE LENS VITRECTOMY MAGNIFYING ODVM

## (undated) DEVICE — EYE KIT AUTO GAS FOR CONSTELLATION 8065751014

## (undated) DEVICE — EYE FLUORESCEIN OPHTHALMIC STRIP FLO-GLO 1272111

## (undated) DEVICE — SU VICRYL 6-0 S-29DA 18" J555G

## (undated) DEVICE — EYE PACK 23GA CONSTELLATION PPK4254-03

## (undated) DEVICE — SOL WATER IRRIG 1000ML BOTTLE 2F7114

## (undated) DEVICE — EYE DRSG PAD OVAL

## (undated) DEVICE — GLOVE PROTEXIS MICRO 7.0  2D73PM70

## (undated) DEVICE — EYE PACK 25GA PLUS CONSTELLATION PPK4253

## (undated) DEVICE — PACK CATARACT UMMC

## (undated) DEVICE — SU PLAIN 6-0 G-1DA 18" 770G

## (undated) DEVICE — EYE CANN SOFT TIP 25GA FOR VALVED SET 8065149530

## (undated) DEVICE — EYE NDL RETROBULBAR ATKINSON 25GA 1.5" 581637

## (undated) DEVICE — SYR 05ML LL W/O NDL

## (undated) DEVICE — EYE CANNULA SOFT TIP 25GA 8065149525

## (undated) RX ORDER — DEXAMETHASONE SODIUM PHOSPHATE 4 MG/ML
INJECTION, SOLUTION INTRA-ARTICULAR; INTRALESIONAL; INTRAMUSCULAR; INTRAVENOUS; SOFT TISSUE
Status: DISPENSED
Start: 2018-10-09

## (undated) RX ORDER — ACETAMINOPHEN 325 MG/1
TABLET ORAL
Status: DISPENSED
Start: 2018-09-04

## (undated) RX ORDER — PROPOFOL 10 MG/ML
INJECTION, EMULSION INTRAVENOUS
Status: DISPENSED
Start: 2018-10-09

## (undated) RX ORDER — ESMOLOL HYDROCHLORIDE 10 MG/ML
INJECTION INTRAVENOUS
Status: DISPENSED
Start: 2018-08-22

## (undated) RX ORDER — DEXAMETHASONE SODIUM PHOSPHATE 4 MG/ML
INJECTION, SOLUTION INTRA-ARTICULAR; INTRALESIONAL; INTRAMUSCULAR; INTRAVENOUS; SOFT TISSUE
Status: DISPENSED
Start: 2019-03-11

## (undated) RX ORDER — LIDOCAINE HYDROCHLORIDE 20 MG/ML
INJECTION, SOLUTION EPIDURAL; INFILTRATION; INTRACAUDAL; PERINEURAL
Status: DISPENSED
Start: 2018-09-04

## (undated) RX ORDER — FENTANYL CITRATE 50 UG/ML
INJECTION, SOLUTION INTRAMUSCULAR; INTRAVENOUS
Status: DISPENSED
Start: 2018-08-22

## (undated) RX ORDER — ONDANSETRON 2 MG/ML
INJECTION INTRAMUSCULAR; INTRAVENOUS
Status: DISPENSED
Start: 2018-09-04

## (undated) RX ORDER — PROPOFOL 10 MG/ML
INJECTION, EMULSION INTRAVENOUS
Status: DISPENSED
Start: 2018-09-04

## (undated) RX ORDER — LIDOCAINE HYDROCHLORIDE 20 MG/ML
INJECTION, SOLUTION EPIDURAL; INFILTRATION; INTRACAUDAL; PERINEURAL
Status: DISPENSED
Start: 2018-08-22

## (undated) RX ORDER — ACETAMINOPHEN 325 MG/1
TABLET ORAL
Status: DISPENSED
Start: 2019-03-11

## (undated) RX ORDER — FENTANYL CITRATE 50 UG/ML
INJECTION, SOLUTION INTRAMUSCULAR; INTRAVENOUS
Status: DISPENSED
Start: 2018-10-09

## (undated) RX ORDER — DEXAMETHASONE SODIUM PHOSPHATE 4 MG/ML
INJECTION, SOLUTION INTRA-ARTICULAR; INTRALESIONAL; INTRAMUSCULAR; INTRAVENOUS; SOFT TISSUE
Status: DISPENSED
Start: 2018-09-04

## (undated) RX ORDER — HYDROMORPHONE HYDROCHLORIDE 1 MG/ML
INJECTION, SOLUTION INTRAMUSCULAR; INTRAVENOUS; SUBCUTANEOUS
Status: DISPENSED
Start: 2018-08-22

## (undated) RX ORDER — FENTANYL CITRATE 50 UG/ML
INJECTION, SOLUTION INTRAMUSCULAR; INTRAVENOUS
Status: DISPENSED
Start: 2018-09-04

## (undated) RX ORDER — CELECOXIB 200 MG/1
CAPSULE ORAL
Status: DISPENSED
Start: 2018-10-09

## (undated) RX ORDER — LIDOCAINE HYDROCHLORIDE 20 MG/ML
INJECTION, SOLUTION EPIDURAL; INFILTRATION; INTRACAUDAL; PERINEURAL
Status: DISPENSED
Start: 2018-10-09

## (undated) RX ORDER — HYDROMORPHONE HYDROCHLORIDE 1 MG/ML
INJECTION, SOLUTION INTRAMUSCULAR; INTRAVENOUS; SUBCUTANEOUS
Status: DISPENSED
Start: 2018-09-04

## (undated) RX ORDER — ONDANSETRON 2 MG/ML
INJECTION INTRAMUSCULAR; INTRAVENOUS
Status: DISPENSED
Start: 2018-08-22

## (undated) RX ORDER — ONDANSETRON 2 MG/ML
INJECTION INTRAMUSCULAR; INTRAVENOUS
Status: DISPENSED
Start: 2019-03-11

## (undated) RX ORDER — ONDANSETRON 2 MG/ML
INJECTION INTRAMUSCULAR; INTRAVENOUS
Status: DISPENSED
Start: 2018-10-09

## (undated) RX ORDER — ACETAMINOPHEN 325 MG/1
TABLET ORAL
Status: DISPENSED
Start: 2018-10-09

## (undated) RX ORDER — GLYCOPYRROLATE 0.2 MG/ML
INJECTION INTRAMUSCULAR; INTRAVENOUS
Status: DISPENSED
Start: 2018-09-04

## (undated) RX ORDER — OXYCODONE HYDROCHLORIDE 5 MG/1
TABLET ORAL
Status: DISPENSED
Start: 2018-08-22

## (undated) RX ORDER — KETOROLAC TROMETHAMINE 30 MG/ML
INJECTION, SOLUTION INTRAMUSCULAR; INTRAVENOUS
Status: DISPENSED
Start: 2018-09-04

## (undated) RX ORDER — PROPOFOL 10 MG/ML
INJECTION, EMULSION INTRAVENOUS
Status: DISPENSED
Start: 2019-03-11

## (undated) RX ORDER — OXYCODONE HYDROCHLORIDE 5 MG/1
TABLET ORAL
Status: DISPENSED
Start: 2018-09-04

## (undated) RX ORDER — PROPOFOL 10 MG/ML
INJECTION, EMULSION INTRAVENOUS
Status: DISPENSED
Start: 2018-08-22

## (undated) RX ORDER — GABAPENTIN 300 MG/1
CAPSULE ORAL
Status: DISPENSED
Start: 2019-03-11

## (undated) RX ORDER — GLYCOPYRROLATE 0.2 MG/ML
INJECTION INTRAMUSCULAR; INTRAVENOUS
Status: DISPENSED
Start: 2018-10-09

## (undated) RX ORDER — GABAPENTIN 300 MG/1
CAPSULE ORAL
Status: DISPENSED
Start: 2018-10-09

## (undated) RX ORDER — FENTANYL CITRATE 50 UG/ML
INJECTION, SOLUTION INTRAMUSCULAR; INTRAVENOUS
Status: DISPENSED
Start: 2019-03-11

## (undated) RX ORDER — LIDOCAINE HYDROCHLORIDE 20 MG/ML
INJECTION, SOLUTION EPIDURAL; INFILTRATION; INTRACAUDAL; PERINEURAL
Status: DISPENSED
Start: 2019-03-11

## (undated) RX ORDER — GABAPENTIN 300 MG/1
CAPSULE ORAL
Status: DISPENSED
Start: 2018-09-04

## (undated) RX ORDER — KETOROLAC TROMETHAMINE 30 MG/ML
INJECTION, SOLUTION INTRAMUSCULAR; INTRAVENOUS
Status: DISPENSED
Start: 2018-10-09

## (undated) RX ORDER — SODIUM CHLORIDE, SODIUM LACTATE, POTASSIUM CHLORIDE, CALCIUM CHLORIDE 600; 310; 30; 20 MG/100ML; MG/100ML; MG/100ML; MG/100ML
INJECTION, SOLUTION INTRAVENOUS
Status: DISPENSED
Start: 2018-08-22